# Patient Record
Sex: MALE | Race: WHITE | NOT HISPANIC OR LATINO | Employment: FULL TIME | URBAN - METROPOLITAN AREA
[De-identification: names, ages, dates, MRNs, and addresses within clinical notes are randomized per-mention and may not be internally consistent; named-entity substitution may affect disease eponyms.]

---

## 2020-12-28 RX ORDER — ATORVASTATIN CALCIUM 40 MG/1
40 TABLET, FILM COATED ORAL DAILY
Qty: 30 TABLET | Refills: 1 | Status: SHIPPED | OUTPATIENT
Start: 2020-12-28 | End: 2021-01-29 | Stop reason: SDUPTHER

## 2020-12-28 RX ORDER — LISINOPRIL 20 MG/1
20 TABLET ORAL DAILY
Qty: 30 TABLET | Refills: 1 | Status: SHIPPED | OUTPATIENT
Start: 2020-12-28 | End: 2021-02-02 | Stop reason: SDUPTHER

## 2020-12-28 RX ORDER — CITALOPRAM 40 MG/1
40 TABLET ORAL DAILY
Qty: 30 TABLET | Refills: 1 | Status: SHIPPED | OUTPATIENT
Start: 2020-12-28 | End: 2021-02-02 | Stop reason: SDUPTHER

## 2020-12-28 NOTE — TELEPHONE ENCOUNTER
Pt had an appt today, insurance isn't active until the 7th of January; asked if we can send in a months worth of meds to get him to an appt that will be covered

## 2021-01-22 DIAGNOSIS — Z00.00 ROUTINE GENERAL MEDICAL EXAMINATION AT A HEALTH CARE FACILITY: Primary | ICD-10-CM

## 2021-01-29 RX ORDER — ATORVASTATIN CALCIUM 40 MG/1
40 TABLET, FILM COATED ORAL DAILY
Qty: 90 TABLET | Refills: 1 | Status: SHIPPED | OUTPATIENT
Start: 2021-01-29 | End: 2021-02-02 | Stop reason: SDUPTHER

## 2021-01-29 NOTE — TELEPHONE ENCOUNTER
Caller: Kamlesh Denis    Relationship: Self    Best call back number: [phone]     Medication needed:   Requested Prescriptions     Pending Prescriptions Disp Refills   • atorvastatin (Lipitor) 40 MG tablet 30 tablet 1     Sig: Take 1 tablet by mouth Daily.       When do you need the refill by: 1-29-21    What details did the patient provide when requesting the medication: PATIENT WILL BE OUT OF MEDICATION BEFORE APPT    Does the patient have less than a 3 day supply:  [x] Yes  [] No    What is the patient's preferred pharmacy:    [facility]  [address] [place] [address]  [phone]

## 2021-02-02 ENCOUNTER — OFFICE VISIT (OUTPATIENT)
Dept: INTERNAL MEDICINE | Facility: CLINIC | Age: 36
End: 2021-02-02

## 2021-02-02 VITALS
RESPIRATION RATE: 16 BRPM | DIASTOLIC BLOOD PRESSURE: 86 MMHG | WEIGHT: 208 LBS | SYSTOLIC BLOOD PRESSURE: 128 MMHG | BODY MASS INDEX: 32.65 KG/M2 | OXYGEN SATURATION: 98 % | TEMPERATURE: 96.8 F | HEIGHT: 67 IN | HEART RATE: 88 BPM

## 2021-02-02 DIAGNOSIS — E78.2 MIXED HYPERLIPIDEMIA: Chronic | ICD-10-CM

## 2021-02-02 DIAGNOSIS — F41.1 GENERALIZED ANXIETY DISORDER: ICD-10-CM

## 2021-02-02 DIAGNOSIS — I10 ESSENTIAL (PRIMARY) HYPERTENSION: ICD-10-CM

## 2021-02-02 DIAGNOSIS — Z00.00 ROUTINE GENERAL MEDICAL EXAMINATION AT A HEALTH CARE FACILITY: Primary | ICD-10-CM

## 2021-02-02 PROCEDURE — 99395 PREV VISIT EST AGE 18-39: CPT | Performed by: INTERNAL MEDICINE

## 2021-02-02 PROCEDURE — 90471 IMMUNIZATION ADMIN: CPT | Performed by: INTERNAL MEDICINE

## 2021-02-02 PROCEDURE — 90714 TD VACC NO PRESV 7 YRS+ IM: CPT | Performed by: INTERNAL MEDICINE

## 2021-02-02 RX ORDER — LISINOPRIL 20 MG/1
20 TABLET ORAL DAILY
Qty: 90 TABLET | Refills: 1 | Status: SHIPPED | OUTPATIENT
Start: 2021-02-02 | End: 2021-08-03 | Stop reason: SDUPTHER

## 2021-02-02 RX ORDER — ATORVASTATIN CALCIUM 40 MG/1
40 TABLET, FILM COATED ORAL DAILY
Qty: 90 TABLET | Refills: 1 | Status: SHIPPED | OUTPATIENT
Start: 2021-02-02 | End: 2021-08-03 | Stop reason: SDUPTHER

## 2021-02-02 RX ORDER — CITALOPRAM 40 MG/1
40 TABLET ORAL DAILY
Qty: 90 TABLET | Refills: 1 | Status: SHIPPED | OUTPATIENT
Start: 2021-02-02 | End: 2021-08-03 | Stop reason: SDUPTHER

## 2021-02-02 NOTE — ASSESSMENT & PLAN NOTE
CONTROLLED  - cont on max dose celexa at this time, mostly controlled with only very few breakthrough symptoms, tolerable--> refills sent  - Reviewed potential side effects of medication including sexual performance changes, insomnia, fatigue, weight changes. Pt tolerating well without any issues.

## 2021-02-02 NOTE — ASSESSMENT & PLAN NOTE
IMPROVING  - started on high intensity statin 1 year ago with LDL >240 at that time, most recent labs with LDL at 111, still slightly above goal of 100 but close and with dramatic improvement, so no other changes to meds today  - refills sent

## 2021-02-02 NOTE — PROGRESS NOTES
Chief Complaint   Patient presents with   • Med Refill   • Anxiety   • Hypertension   • Hyperlipidemia   Annual Physical    Subjective   History of Present Illness    Kamlesh Denis is a 35 y.o. male here for annual wellness. Pt does not have any acute issues to discuss today. States overall things are going well. Taking all meds as prescribed without any issues.   Started on chol meds 1 year ago and has not had labs since that time.   Does still have occasional breakthrough on his anxiety meds, but overall feels like this has helped a lot and is relatively well controlled.     The following portions of the patient's history were reviewed and updated as appropriate: allergies, current medications, past family history, past medical history, past social history, past surgical history, and problem list.    Patient Care Team:  Dionne Dye MD as PCP - General (Internal Medicine & Pediatrics)    Review of Systems   Constitutional: Negative for activity change, chills and fever.   HENT: Negative for congestion, ear pain, rhinorrhea and sore throat.    Eyes: Negative for double vision, pain and visual disturbance.   Respiratory: Negative for cough, shortness of breath and wheezing.    Cardiovascular: Negative for chest pain, palpitations and leg swelling.   Gastrointestinal: Negative for abdominal pain, blood in stool, constipation, diarrhea, nausea and vomiting.   Endocrine: Negative for cold intolerance and heat intolerance.   Genitourinary: Negative for difficulty urinating, dysuria and frequency.   Musculoskeletal: Negative for arthralgias and myalgias.   Skin: Negative for rash and skin lesions.   Neurological: Negative for dizziness, tremors and headache.   Hematological: Negative for adenopathy. Does not bruise/bleed easily.   Psychiatric/Behavioral: Negative for behavioral problems and suicidal ideas.       Body mass index is 32.58 kg/m².   Vitals:    02/02/21 1458   BP: 128/86   Pulse: 88   Resp: 16   Temp:  "96.8 °F (36 °C)   SpO2: 98%   Weight: 94.3 kg (208 lb)   Height: 170.2 cm (67\")        Objective   Physical Exam  Vitals signs and nursing note reviewed.   Constitutional:       General: He is not in acute distress.     Appearance: Normal appearance.   HENT:      Head: Normocephalic and atraumatic.      Right Ear: Tympanic membrane and ear canal normal.      Left Ear: Tympanic membrane and ear canal normal.      Nose: Nose normal.      Mouth/Throat:      Mouth: Mucous membranes are moist.      Pharynx: Oropharynx is clear.   Eyes:      Extraocular Movements: Extraocular movements intact.      Conjunctiva/sclera: Conjunctivae normal.      Pupils: Pupils are equal, round, and reactive to light.   Neck:      Musculoskeletal: Normal range of motion and neck supple.   Cardiovascular:      Rate and Rhythm: Normal rate and regular rhythm.      Heart sounds: Normal heart sounds. No murmur.   Pulmonary:      Effort: Pulmonary effort is normal. No respiratory distress.      Breath sounds: Normal breath sounds. No wheezing or rhonchi.   Abdominal:      General: Bowel sounds are normal. There is no distension.      Palpations: Abdomen is soft. There is no mass.      Tenderness: There is no abdominal tenderness.      Comments: no hepatosplenomegaly   Musculoskeletal: Normal range of motion.         General: No deformity.   Skin:     General: Skin is warm and dry.      Capillary Refill: Capillary refill takes less than 2 seconds.      Findings: No lesion or rash.   Neurological:      General: No focal deficit present.      Mental Status: He is alert and oriented to person, place, and time.      Cranial Nerves: No cranial nerve deficit.   Psychiatric:         Mood and Affect: Mood normal.         Behavior: Behavior normal.         Judgment: Judgment normal.           Current Outpatient Medications:   •  atorvastatin (Lipitor) 40 MG tablet, Take 1 tablet by mouth Daily., Disp: 90 tablet, Rfl: 1  •  citalopram (CeleXA) 40 MG tablet, " Take 1 tablet by mouth Daily., Disp: 90 tablet, Rfl: 1  •  lisinopril (PRINIVIL,ZESTRIL) 20 MG tablet, Take 1 tablet by mouth Daily., Disp: 90 tablet, Rfl: 1     Lab Results   Component Value Date    HGBA1C 5.80 (H) 01/25/2021    TSH 0.614 01/25/2021        Health Maintenance   Topic Date Due   • TDAP/TD VACCINES (1 - Tdap) 10/02/2002   • INFLUENZA VACCINE  02/02/2022 (Originally 8/1/2020)   • LIPID PANEL  01/25/2022        Immunization History   Administered Date(s) Administered   • TD Preservative Free 02/02/2021       Assessment/Plan     1. Annual Wellness  - Labs ordered and reviewed including CBC, CMP, Fasting lipid panel, HgbA1C and TSH. Will call with results once available and make follow up plan and med changes as appropriate.   - Cont current medications for chronic medical issues, refills sent as needed today.   - EKG not indicated  - Cscope not indicated  - Prostate CA screening discussed with patient. Risks and benefits of all screening modalities reviewed, pt opts to proceed with no prostate cancer screening at this time  - Lung CA screening not indicated  - AAA screening not indicated  - Immunizations: declines Flu  Orders Placed This Encounter   Procedures   • Td Vaccine Greater Than or Equal To 8yo Preservative Free IM      - Advised behavioral modifications including dietary changes and increased exercise with goal of healthy weight and lifestyle.    2. Mixed hyperlipidemia  Assessment & Plan:  IMPROVING  - started on high intensity statin 1 year ago with LDL >240 at that time, most recent labs with LDL at 111, still slightly above goal of 100 but close and with dramatic improvement, so no other changes to meds today  - refills sent    Orders:  -     atorvastatin (Lipitor) 40 MG tablet; Take 1 tablet by mouth Daily.  Dispense: 90 tablet; Refill: 1    3. Essential (primary) hypertension  Assessment & Plan:  CONTROLLED  - cont on ACEi at this time, BP in goal range  - Cr and K+ checked and within  normal limits on this med  - Cont checking BP at home intermittently, call if values trend outside of goal range  Orders:  -     lisinopril (PRINIVIL,ZESTRIL) 20 MG tablet; Take 1 tablet by mouth Daily.  Dispense: 90 tablet; Refill: 1    4. Generalized anxiety disorder  Assessment & Plan:  CONTROLLED  - cont on max dose celexa at this time, mostly controlled with only very few breakthrough symptoms, tolerable--> refills sent  - Reviewed potential side effects of medication including sexual performance changes, insomnia, fatigue, weight changes. Pt tolerating well without any issues.     Orders:  -     citalopram (CeleXA) 40 MG tablet; Take 1 tablet by mouth Daily.  Dispense: 90 tablet; Refill: 1    Return in about 6 months (around 8/2/2021) for follow up HTN, HLD.     Ashlie Dye MD  [facility] [place] [facility]  Phone: [phone]  Fax: [phone]

## 2021-02-02 NOTE — ASSESSMENT & PLAN NOTE
CONTROLLED  - cont on ACEi at this time, BP in goal range  - Cr and K+ checked and within normal limits on this med  - Cont checking BP at home intermittently, call if values trend outside of goal range

## 2021-08-03 ENCOUNTER — OFFICE VISIT (OUTPATIENT)
Dept: INTERNAL MEDICINE | Facility: CLINIC | Age: 36
End: 2021-08-03

## 2021-08-03 VITALS
TEMPERATURE: 96.6 F | HEIGHT: 67 IN | DIASTOLIC BLOOD PRESSURE: 74 MMHG | SYSTOLIC BLOOD PRESSURE: 126 MMHG | RESPIRATION RATE: 16 BRPM | HEART RATE: 76 BPM | BODY MASS INDEX: 32.33 KG/M2 | OXYGEN SATURATION: 97 % | WEIGHT: 206 LBS

## 2021-08-03 DIAGNOSIS — I10 ESSENTIAL (PRIMARY) HYPERTENSION: Primary | ICD-10-CM

## 2021-08-03 DIAGNOSIS — E78.2 MIXED HYPERLIPIDEMIA: Chronic | ICD-10-CM

## 2021-08-03 DIAGNOSIS — F41.1 GENERALIZED ANXIETY DISORDER: ICD-10-CM

## 2021-08-03 PROCEDURE — 99214 OFFICE O/P EST MOD 30 MIN: CPT | Performed by: INTERNAL MEDICINE

## 2021-08-03 RX ORDER — LISINOPRIL 20 MG/1
20 TABLET ORAL DAILY
Qty: 90 TABLET | Refills: 1 | Status: SHIPPED | OUTPATIENT
Start: 2021-08-03 | End: 2021-09-16

## 2021-08-03 RX ORDER — BUSPIRONE HYDROCHLORIDE 7.5 MG/1
7.5 TABLET ORAL 2 TIMES DAILY
Qty: 60 TABLET | Refills: 1 | Status: SHIPPED | OUTPATIENT
Start: 2021-08-03 | End: 2021-09-03 | Stop reason: SDUPTHER

## 2021-08-03 RX ORDER — CITALOPRAM 40 MG/1
40 TABLET ORAL DAILY
Qty: 90 TABLET | Refills: 1 | Status: SHIPPED | OUTPATIENT
Start: 2021-08-03 | End: 2021-09-16

## 2021-08-03 RX ORDER — ATORVASTATIN CALCIUM 40 MG/1
40 TABLET, FILM COATED ORAL DAILY
Qty: 90 TABLET | Refills: 1 | Status: SHIPPED | OUTPATIENT
Start: 2021-08-03 | End: 2022-02-03

## 2021-08-03 NOTE — ASSESSMENT & PLAN NOTE
UNCONTROLLED  - cont on max dose celexa at this time, mostly controlled but with more notable breakthrough symptoms lately, will add low dose buspar to improve overall anxiety--> refills and Rx sent  - if new med leads to stable improvement in symptoms, pt should call in 1 month with clinical update and will send in Rx refills ongoing, if not effective, should schedule follow up to discuss other med options  - Reviewed potential side effects of medication including sexual performance changes, insomnia, fatigue, weight changes. Pt tolerating well without any issues.

## 2021-08-03 NOTE — ASSESSMENT & PLAN NOTE
CONTROLLED  - cont on ACEi at this time, BP in goal range--> refills sent  - Cont checking BP at home intermittently, call if values trend outside of goal range

## 2021-08-03 NOTE — PROGRESS NOTES
"Chief Complaint  Follow-up, Med Refill, Hyperlipidemia, and Hypertension    Subjective          Kamlesh Denis presents to [facility] for follow up and med refills. Pt taking all medications daily as prescribed with good reported compliance. No issues or side effects with meds.   Does note that he has had more anxious moments than he used to get, not as severe as it was prior to starting medications several years ago, but notable change in past 8 months. Does not occur daily but multiple times monthly. Doesn't seem to have any specific trigger, no major life changes or stressors that he can identify.       Objective   Vital Signs:     /74   Pulse 76   Temp 96.6 °F (35.9 °C)   Resp 16   Ht 170.2 cm (67\")   Wt 93.4 kg (206 lb)   SpO2 97%   BMI 32.26 kg/m²     Physical Exam  Vitals and nursing note reviewed.   Constitutional:       General: He is not in acute distress.     Appearance: Normal appearance.   Cardiovascular:      Rate and Rhythm: Normal rate and regular rhythm.      Pulses: Normal pulses.      Heart sounds: Normal heart sounds. No murmur heard.     Pulmonary:      Effort: Pulmonary effort is normal. No respiratory distress.      Breath sounds: Normal breath sounds.   Abdominal:      General: Abdomen is flat. Bowel sounds are normal.      Palpations: Abdomen is soft.      Tenderness: There is no abdominal tenderness.   Musculoskeletal:      Right lower leg: No edema.      Left lower leg: No edema.   Neurological:      Mental Status: He is alert and oriented to person, place, and time. Mental status is at baseline.   Psychiatric:         Mood and Affect: Mood normal.         Behavior: Behavior normal.          Result Review :   The following data was reviewed by: Dionne Dye MD on 08/03/2021:  CMP    CMP 1/25/21   Glucose 93   BUN 15   Creatinine 0.87   eGFR Non  Am 99   eGFR African Am 120   Sodium 140   Potassium 4.4   Chloride 103 "   Calcium 9.4   Total Protein 7.3   Albumin 4.50   Globulin 2.8   Total Bilirubin 0.2   Alkaline Phosphatase 91   AST (SGOT) 40   ALT (SGPT) 51 (A)   (A) Abnormal value       Comments are available for some flowsheets but are not being displayed.           CBC w/diff    CBC w/Diff 1/25/21   WBC 4.45   RBC 4.66   Hemoglobin 14.6   Hematocrit 43.1   MCV 92.5   MCH 31.3   MCHC 33.9   RDW 13.1   Platelets 192   Neutrophil Rel % 61.0   Lymphocyte Rel % 24.0   Monocyte Rel % 8.5   Eosinophil Rel % 5.4   Basophil Rel % 0.7           Lipid Panel    Lipid Panel 1/25/21   Total Cholesterol 194   Triglycerides 167 (A)   HDL Cholesterol 54   VLDL Cholesterol 29   LDL Cholesterol  111 (A)   (A) Abnormal value       Comments are available for some flowsheets but are not being displayed.           TSH    TSH 1/25/21   TSH 0.614           Most Recent A1C    HGBA1C Most Recent 1/25/21   Hemoglobin A1C 5.80 (A)   (A) Abnormal value       Comments are available for some flowsheets but are not being displayed.           Assessment and Plan      Diagnoses and all orders for this visit:    1. Essential (primary) hypertension (Primary)  Assessment & Plan:  CONTROLLED  - cont on ACEi at this time, BP in goal range--> refills sent  - Cont checking BP at home intermittently, call if values trend outside of goal range        Orders:  -     Comprehensive Metabolic Panel  -     Hemoglobin A1c  -     lisinopril (PRINIVIL,ZESTRIL) 20 MG tablet; Take 1 tablet by mouth Daily.  Dispense: 90 tablet; Refill: 1    2. Mixed hyperlipidemia  Assessment & Plan:  CONTROLLED  - FLP today for ongoing monitoring  - cont on current high intensity statin, will adjust dose as indicated based on labs  - cont with good diet and lifestyle changes including increased exercise, low chol and low fat diet, and increased fiber      Orders:  -     Lipid Panel  -     atorvastatin (Lipitor) 40 MG tablet; Take 1 tablet by mouth Daily.  Dispense: 90 tablet; Refill: 1    3.  Generalized anxiety disorder  Assessment & Plan:  UNCONTROLLED  - cont on max dose celexa at this time, mostly controlled but with more notable breakthrough symptoms lately, will add low dose buspar to improve overall anxiety--> refills and Rx sent  - Reviewed potential side effects of medication including sexual performance changes, insomnia, fatigue, weight changes. Pt tolerating well without any issues.       Orders:  -     citalopram (CeleXA) 40 MG tablet; Take 1 tablet by mouth Daily.  Dispense: 90 tablet; Refill: 1  -     busPIRone (BUSPAR) 7.5 MG tablet; Take 1 tablet by mouth 2 (two) times a day.  Dispense: 60 tablet; Refill: 1      Follow Up   Return in about 6 months (around 2/3/2022) for Annual physical.    Patient was given instructions and counseling regarding his condition or for health maintenance advice. Please see specific information pulled into the AVS if appropriate.     Ashlie Dye MD  [facility] [place] [facility]  Phone: [phone]  Fax: [phone]

## 2021-08-03 NOTE — ASSESSMENT & PLAN NOTE
CONTROLLED  - FLP today for ongoing monitoring  - cont on current high intensity statin, will adjust dose as indicated based on labs  - cont with good diet and lifestyle changes including increased exercise, low chol and low fat diet, and increased fiber

## 2021-08-04 LAB
ALBUMIN SERPL-MCNC: 5 G/DL (ref 3.5–5.2)
ALBUMIN/GLOB SERPL: 1.7 G/DL
ALP SERPL-CCNC: 97 U/L (ref 39–117)
ALT SERPL-CCNC: 46 U/L (ref 1–41)
AST SERPL-CCNC: 35 U/L (ref 1–40)
BILIRUB SERPL-MCNC: 0.3 MG/DL (ref 0–1.2)
BUN SERPL-MCNC: 13 MG/DL (ref 6–20)
BUN/CREAT SERPL: 13.3 (ref 7–25)
CALCIUM SERPL-MCNC: 10.3 MG/DL (ref 8.6–10.5)
CHLORIDE SERPL-SCNC: 101 MMOL/L (ref 98–107)
CHOLEST SERPL-MCNC: 233 MG/DL (ref 0–200)
CO2 SERPL-SCNC: 28.3 MMOL/L (ref 22–29)
CREAT SERPL-MCNC: 0.98 MG/DL (ref 0.76–1.27)
GLOBULIN SER CALC-MCNC: 3 GM/DL
GLUCOSE SERPL-MCNC: 82 MG/DL (ref 65–99)
HBA1C MFR BLD: 5.7 % (ref 4.8–5.6)
HDLC SERPL-MCNC: 49 MG/DL (ref 40–60)
LDLC SERPL CALC-MCNC: 126 MG/DL (ref 0–100)
POTASSIUM SERPL-SCNC: 4.4 MMOL/L (ref 3.5–5.2)
PROT SERPL-MCNC: 8 G/DL (ref 6–8.5)
SODIUM SERPL-SCNC: 141 MMOL/L (ref 136–145)
TRIGL SERPL-MCNC: 329 MG/DL (ref 0–150)
VLDLC SERPL CALC-MCNC: 58 MG/DL (ref 5–40)

## 2021-09-03 ENCOUNTER — TELEPHONE (OUTPATIENT)
Dept: INTERNAL MEDICINE | Facility: CLINIC | Age: 36
End: 2021-09-03

## 2021-09-03 DIAGNOSIS — F41.1 GENERALIZED ANXIETY DISORDER: ICD-10-CM

## 2021-09-03 RX ORDER — BUSPIRONE HYDROCHLORIDE 7.5 MG/1
7.5 TABLET ORAL 2 TIMES DAILY
Qty: 180 TABLET | Refills: 1 | Status: SHIPPED | OUTPATIENT
Start: 2021-09-03 | End: 2022-02-10 | Stop reason: SDUPTHER

## 2021-09-03 NOTE — TELEPHONE ENCOUNTER
Caller: Kamlesh Denis    Relationship: Self    Best call back number: [phone]    What is the best time to reach you: ANY    Who are you requesting to speak with (clinical staff, provider,  specific staff member): DR SAVAGE    What was the call regarding: PATIENT CALLED AND STATED PHARMACY WILL NOT REFILL BUSPAR GENERIC. PLEASE CALL PATIENT TO DISCUSS    Do you require a callback: YES

## 2021-09-16 DIAGNOSIS — F41.1 GENERALIZED ANXIETY DISORDER: ICD-10-CM

## 2021-09-16 DIAGNOSIS — I10 ESSENTIAL (PRIMARY) HYPERTENSION: ICD-10-CM

## 2021-09-16 RX ORDER — LISINOPRIL 20 MG/1
TABLET ORAL
Qty: 90 TABLET | Refills: 1 | Status: SHIPPED | OUTPATIENT
Start: 2021-09-16 | End: 2022-02-10 | Stop reason: SDUPTHER

## 2021-09-16 RX ORDER — CITALOPRAM 40 MG/1
TABLET ORAL
Qty: 90 TABLET | Refills: 1 | Status: SHIPPED | OUTPATIENT
Start: 2021-09-16 | End: 2022-02-10 | Stop reason: SDUPTHER

## 2021-09-16 NOTE — TELEPHONE ENCOUNTER
Rx Refill Note  Requested Prescriptions     Pending Prescriptions Disp Refills   • citalopram (CeleXA) 40 MG tablet [Pharmacy Med Name: CITALOPRAM HBR 40 MG TABLET] 90 tablet 1     Sig: TAKE 1 TABLET BY MOUTH EVERY DAY   • lisinopril (PRINIVIL,ZESTRIL) 20 MG tablet [Pharmacy Med Name: LISINOPRIL 20 MG TABLET] 90 tablet 1     Sig: TAKE 1 TABLET BY MOUTH EVERY DAY      Last office visit with prescribing clinician: 8/3/2021      Next office visit with prescribing clinician: 2/10/2022            Betzaida Mojica MA  09/16/21, 08:24 EDT

## 2022-02-03 DIAGNOSIS — E78.2 MIXED HYPERLIPIDEMIA: Chronic | ICD-10-CM

## 2022-02-03 RX ORDER — ATORVASTATIN CALCIUM 40 MG/1
TABLET, FILM COATED ORAL
Qty: 90 TABLET | Refills: 1 | Status: SHIPPED | OUTPATIENT
Start: 2022-02-03 | End: 2022-02-10 | Stop reason: SDUPTHER

## 2022-02-03 NOTE — TELEPHONE ENCOUNTER
Rx Refill Note  Requested Prescriptions     Pending Prescriptions Disp Refills   • atorvastatin (LIPITOR) 40 MG tablet [Pharmacy Med Name: ATORVASTATIN 40 MG TABLET] 90 tablet 1     Sig: TAKE 1 TABLET BY MOUTH EVERY DAY      Last office visit with prescribing clinician: 8/3/2021      Next office visit with prescribing clinician: 2/10/2022            Betzaida Mojica MA  02/03/22, 07:53 EST

## 2022-02-10 ENCOUNTER — OFFICE VISIT (OUTPATIENT)
Dept: INTERNAL MEDICINE | Facility: CLINIC | Age: 37
End: 2022-02-10

## 2022-02-10 VITALS
OXYGEN SATURATION: 98 % | DIASTOLIC BLOOD PRESSURE: 90 MMHG | RESPIRATION RATE: 16 BRPM | HEIGHT: 67 IN | BODY MASS INDEX: 32.65 KG/M2 | TEMPERATURE: 97.1 F | HEART RATE: 88 BPM | WEIGHT: 208 LBS | SYSTOLIC BLOOD PRESSURE: 140 MMHG

## 2022-02-10 DIAGNOSIS — Z00.00 ROUTINE GENERAL MEDICAL EXAMINATION AT A HEALTH CARE FACILITY: Primary | ICD-10-CM

## 2022-02-10 DIAGNOSIS — E78.2 MIXED HYPERLIPIDEMIA: Chronic | ICD-10-CM

## 2022-02-10 DIAGNOSIS — Z11.59 ENCOUNTER FOR HEPATITIS C SCREENING TEST FOR LOW RISK PATIENT: ICD-10-CM

## 2022-02-10 DIAGNOSIS — F41.1 GENERALIZED ANXIETY DISORDER: ICD-10-CM

## 2022-02-10 DIAGNOSIS — I10 ESSENTIAL (PRIMARY) HYPERTENSION: ICD-10-CM

## 2022-02-10 LAB
BILIRUB BLD-MCNC: NEGATIVE MG/DL
CLARITY, POC: CLEAR
COLOR UR: YELLOW
EXPIRATION DATE: ABNORMAL
GLUCOSE UR STRIP-MCNC: NEGATIVE MG/DL
KETONES UR QL: NEGATIVE
LEUKOCYTE EST, POC: NEGATIVE
Lab: ABNORMAL
NITRITE UR-MCNC: NEGATIVE MG/ML
PH UR: 8.5 [PH] (ref 5–8)
PROT UR STRIP-MCNC: NEGATIVE MG/DL
RBC # UR STRIP: NEGATIVE /UL
SP GR UR: 1.02 (ref 1–1.03)
UROBILINOGEN UR QL: NORMAL

## 2022-02-10 PROCEDURE — 81003 URINALYSIS AUTO W/O SCOPE: CPT | Performed by: INTERNAL MEDICINE

## 2022-02-10 PROCEDURE — 99395 PREV VISIT EST AGE 18-39: CPT | Performed by: INTERNAL MEDICINE

## 2022-02-10 RX ORDER — ATORVASTATIN CALCIUM 40 MG/1
40 TABLET, FILM COATED ORAL DAILY
Qty: 90 TABLET | Refills: 1 | Status: SHIPPED | OUTPATIENT
Start: 2022-02-10 | End: 2022-02-11 | Stop reason: SDUPTHER

## 2022-02-10 RX ORDER — LISINOPRIL 20 MG/1
20 TABLET ORAL DAILY
Qty: 90 TABLET | Refills: 1 | Status: SHIPPED | OUTPATIENT
Start: 2022-02-10 | End: 2022-07-12 | Stop reason: SDUPTHER

## 2022-02-10 RX ORDER — BUSPIRONE HYDROCHLORIDE 10 MG/1
10 TABLET ORAL 2 TIMES DAILY
Qty: 180 TABLET | Refills: 1 | Status: SHIPPED | OUTPATIENT
Start: 2022-02-10 | End: 2022-05-02 | Stop reason: SDUPTHER

## 2022-02-10 RX ORDER — CITALOPRAM 40 MG/1
40 TABLET ORAL DAILY
Qty: 90 TABLET | Refills: 1 | Status: SHIPPED | OUTPATIENT
Start: 2022-02-10 | End: 2022-07-12 | Stop reason: SDUPTHER

## 2022-02-10 NOTE — PROGRESS NOTES
Chief Complaint   Patient presents with   • Annual Exam       Subjective   History of Present Illness    Kamlesh Denis is a 36 y.o. male here for annual wellness. Pt does have any acute issues to discuss today. States overall things are going well. Taking all meds as prescribed without any issues.   Taking celexa daily for anxiety, buspar added at last visit and did feel like it was working well but has tapered off in effect lately. No side effects, is feeling more drowsy lately.     The following portions of the patient's history were reviewed and updated as appropriate: allergies, current medications, past family history, past medical history, past social history, past surgical history, and problem list.    Patient Care Team:  Dionne Dye MD as PCP - General (Internal Medicine & Pediatrics)    Review of Systems   Constitutional: Negative for activity change, chills and fever.   HENT: Negative for congestion, ear pain, rhinorrhea and sore throat.    Eyes: Negative for double vision, pain and visual disturbance.   Respiratory: Negative for cough, shortness of breath and wheezing.    Cardiovascular: Negative for chest pain, palpitations and leg swelling.   Gastrointestinal: Negative for abdominal pain, blood in stool, constipation, diarrhea, nausea and vomiting.   Endocrine: Negative for cold intolerance and heat intolerance.   Genitourinary: Negative for difficulty urinating, dysuria and frequency.   Musculoskeletal: Negative for arthralgias and myalgias.   Skin: Negative for rash and skin lesions.   Neurological: Negative for dizziness, tremors and headache.   Hematological: Negative for adenopathy. Does not bruise/bleed easily.   Psychiatric/Behavioral: Negative for behavioral problems and suicidal ideas. The patient is nervous/anxious.        Body mass index is 32.58 kg/m².   Vitals:    02/10/22 1027   BP: 140/90   Pulse: 88   Resp: 16   Temp: 97.1 °F (36.2 °C)   SpO2: 98%   Weight: 94.3 kg (208 lb)  "  Height: 170.2 cm (67\")        Objective   Physical Exam  Vitals and nursing note reviewed.   Constitutional:       General: He is not in acute distress.     Appearance: Normal appearance.   HENT:      Head: Normocephalic and atraumatic.      Right Ear: Tympanic membrane and ear canal normal.      Left Ear: Tympanic membrane and ear canal normal.      Nose: Nose normal.      Mouth/Throat:      Mouth: Mucous membranes are moist.      Pharynx: Oropharynx is clear.   Eyes:      Extraocular Movements: Extraocular movements intact.      Conjunctiva/sclera: Conjunctivae normal.      Pupils: Pupils are equal, round, and reactive to light.   Cardiovascular:      Rate and Rhythm: Normal rate and regular rhythm.      Heart sounds: Normal heart sounds. No murmur heard.      Pulmonary:      Effort: Pulmonary effort is normal. No respiratory distress.      Breath sounds: Normal breath sounds. No wheezing or rhonchi.   Abdominal:      General: Bowel sounds are normal. There is no distension.      Palpations: Abdomen is soft. There is no mass.      Tenderness: There is no abdominal tenderness.      Comments: no hepatosplenomegaly   Musculoskeletal:         General: No deformity. Normal range of motion.      Cervical back: Normal range of motion and neck supple.   Skin:     General: Skin is warm and dry.      Capillary Refill: Capillary refill takes less than 2 seconds.      Findings: No lesion or rash.   Neurological:      General: No focal deficit present.      Mental Status: He is alert and oriented to person, place, and time.      Cranial Nerves: No cranial nerve deficit.   Psychiatric:         Mood and Affect: Mood normal.         Behavior: Behavior normal.         Judgment: Judgment normal.       Brief Urine Lab Results  (Last result in the past 365 days)      Color   Clarity   Blood   Leuk Est   Nitrite   Protein   CREAT   Urine HCG        02/10/22 1341 Yellow   Clear   Negative   Negative   Negative   Negative           "           Current Outpatient Medications:   •  atorvastatin (LIPITOR) 40 MG tablet, Take 1 tablet by mouth Daily., Disp: 90 tablet, Rfl: 1  •  busPIRone (BUSPAR) 10 MG tablet, Take 1 tablet by mouth 2 (Two) Times a Day., Disp: 180 tablet, Rfl: 1  •  citalopram (CeleXA) 40 MG tablet, Take 1 tablet by mouth Daily., Disp: 90 tablet, Rfl: 1  •  lisinopril (PRINIVIL,ZESTRIL) 20 MG tablet, Take 1 tablet by mouth Daily., Disp: 90 tablet, Rfl: 1  •  azithromycin (ZITHROMAX) 250 MG tablet, Take 2 tablets the first day, then 1 tablet daily for 4 days., Disp: 6 tablet, Rfl: 0     Lab Results   Component Value Date    HGBA1C 5.70 (H) 08/03/2021    TSH 0.614 01/25/2021        Health Maintenance   Topic Date Due   • INFLUENZA VACCINE  02/10/2023 (Originally 8/1/2021)   • LIPID PANEL  08/03/2022   • TDAP/TD VACCINES (2 - Tdap) 02/02/2031        Immunization History   Administered Date(s) Administered   • TD Preservative Free 02/02/2021       Assessment/Plan     Annual Wellness  - Labs ordered today including CBC, CMP, Fasting lipid panel, HgbA1C, TSH and Hep C. Will call with results once available and make follow up plan and med changes as appropriate.   - Cont current medications for chronic medical issues, refills sent as needed today.   - EKG not indicated  - Cscope not yet indicated, due at 44 yo  - Prostate CA screening discussed with patient. Risks and benefits of all screening modalities reviewed, pt opts to proceed with no prostate cancer screening at this time  - Lung CA screening not indicated  - AAA screening not indicated   - Immunizations: Flu shot declined. COVID vaccination declined. Td UTD in 2/2021.   - Depression screen reviewed with patient and positive. On celexa at max dose with buspar twice daily, will increase dose of buspar for improved control.   - Advised behavioral modifications including dietary changes and increased exercise with goal of healthy weight and lifestyle.       Return in about 6 months  (around 8/10/2022) for follow up HTN, HLD, anxiety.     Ashlie Dye MD  [facility] [place] [facility]  Phone: [phone]  Fax: [phone]

## 2022-02-11 ENCOUNTER — PATIENT MESSAGE (OUTPATIENT)
Dept: INTERNAL MEDICINE | Facility: CLINIC | Age: 37
End: 2022-02-11

## 2022-02-11 DIAGNOSIS — E78.2 MIXED HYPERLIPIDEMIA: Chronic | ICD-10-CM

## 2022-02-11 LAB
ALBUMIN SERPL-MCNC: 4.8 G/DL (ref 4–5)
ALBUMIN/GLOB SERPL: 1.5 {RATIO} (ref 1.2–2.2)
ALP SERPL-CCNC: 110 IU/L (ref 44–121)
ALT SERPL-CCNC: 44 IU/L (ref 0–44)
AST SERPL-CCNC: 35 IU/L (ref 0–40)
BASOPHILS # BLD AUTO: 0 X10E3/UL (ref 0–0.2)
BASOPHILS NFR BLD AUTO: 1 %
BILIRUB SERPL-MCNC: 0.6 MG/DL (ref 0–1.2)
BUN SERPL-MCNC: 14 MG/DL (ref 6–20)
BUN/CREAT SERPL: 14 (ref 9–20)
CALCIUM SERPL-MCNC: 9.8 MG/DL (ref 8.7–10.2)
CHLORIDE SERPL-SCNC: 98 MMOL/L (ref 96–106)
CHOLEST SERPL-MCNC: 202 MG/DL (ref 100–199)
CO2 SERPL-SCNC: 24 MMOL/L (ref 20–29)
CREAT SERPL-MCNC: 1.03 MG/DL (ref 0.76–1.27)
EOSINOPHIL # BLD AUTO: 0.2 X10E3/UL (ref 0–0.4)
EOSINOPHIL NFR BLD AUTO: 5 %
ERYTHROCYTE [DISTWIDTH] IN BLOOD BY AUTOMATED COUNT: 12.7 % (ref 11.6–15.4)
GLOBULIN SER CALC-MCNC: 3.1 G/DL (ref 1.5–4.5)
GLUCOSE SERPL-MCNC: 98 MG/DL (ref 65–99)
HBA1C MFR BLD: 5.8 % (ref 4.8–5.6)
HCT VFR BLD AUTO: 44.9 % (ref 37.5–51)
HCV AB S/CO SERPL IA: <0.1 S/CO RATIO (ref 0–0.9)
HDLC SERPL-MCNC: 54 MG/DL
HGB BLD-MCNC: 15.5 G/DL (ref 13–17.7)
IMM GRANULOCYTES # BLD AUTO: 0 X10E3/UL (ref 0–0.1)
IMM GRANULOCYTES NFR BLD AUTO: 0 %
LDLC SERPL CALC-MCNC: 125 MG/DL (ref 0–99)
LYMPHOCYTES # BLD AUTO: 1 X10E3/UL (ref 0.7–3.1)
LYMPHOCYTES NFR BLD AUTO: 27 %
MCH RBC QN AUTO: 31.5 PG (ref 26.6–33)
MCHC RBC AUTO-ENTMCNC: 34.5 G/DL (ref 31.5–35.7)
MCV RBC AUTO: 91 FL (ref 79–97)
MONOCYTES # BLD AUTO: 0.3 X10E3/UL (ref 0.1–0.9)
MONOCYTES NFR BLD AUTO: 8 %
NEUTROPHILS # BLD AUTO: 2.1 X10E3/UL (ref 1.4–7)
NEUTROPHILS NFR BLD AUTO: 59 %
PLATELET # BLD AUTO: 213 X10E3/UL (ref 150–450)
POTASSIUM SERPL-SCNC: 4.8 MMOL/L (ref 3.5–5.2)
PROT SERPL-MCNC: 7.9 G/DL (ref 6–8.5)
RBC # BLD AUTO: 4.92 X10E6/UL (ref 4.14–5.8)
SODIUM SERPL-SCNC: 137 MMOL/L (ref 134–144)
TRIGL SERPL-MCNC: 132 MG/DL (ref 0–149)
TSH SERPL DL<=0.005 MIU/L-ACNC: 0.84 UIU/ML (ref 0.45–4.5)
VLDLC SERPL CALC-MCNC: 23 MG/DL (ref 5–40)
WBC # BLD AUTO: 3.6 X10E3/UL (ref 3.4–10.8)

## 2022-02-11 RX ORDER — ATORVASTATIN CALCIUM 40 MG/1
80 TABLET, FILM COATED ORAL DAILY
Qty: 90 TABLET | Refills: 1 | Status: SHIPPED | OUTPATIENT
Start: 2022-02-11 | End: 2022-05-02 | Stop reason: SDUPTHER

## 2022-02-11 NOTE — TELEPHONE ENCOUNTER
From: ELVIRA KARIMI  To: Kamlesh Denis  Sent: 2/11/2022 10:47 AM EST  Subject: Lab Results    Kamlesh,     I tried to call this morning but got your voicemail.    Below is Dr. Dye's interpretation on your most recent lab results:    1. Blood counts and electrolytes normal   2. Kidney, liver, thyroid function all in normal range   3. Diabetes test in preDM range but stable   4. Chol panel still slightly above goal with bad chol in 120s, since we are already on meds, goal would be to be below the 100 breanna, would recommend increasing the dose of his atorvastatin if he is ok with this?     If you have any questions please feel free to let me know.    Thanks!  Betzaida

## 2022-03-03 RX ORDER — BUSPIRONE HYDROCHLORIDE 7.5 MG/1
TABLET ORAL
Qty: 180 TABLET | Refills: 1 | OUTPATIENT
Start: 2022-03-03

## 2022-03-03 NOTE — TELEPHONE ENCOUNTER
Rx Refill Note  Requested Prescriptions     Pending Prescriptions Disp Refills   • busPIRone (BUSPAR) 7.5 MG tablet [Pharmacy Med Name: BUSPIRONE HCL 7.5 MG TABLET] 180 tablet 1     Sig: TAKE 1 TABLET BY MOUTH TWICE A DAY      Last office visit with prescribing clinician: 2/10/2022      Next office visit with prescribing clinician: 8/15/2022            Betzaida Mojica MA  03/03/22, 08:19 EST

## 2022-05-02 ENCOUNTER — TELEPHONE (OUTPATIENT)
Dept: INTERNAL MEDICINE | Facility: CLINIC | Age: 37
End: 2022-05-02

## 2022-05-02 DIAGNOSIS — E78.2 MIXED HYPERLIPIDEMIA: Chronic | ICD-10-CM

## 2022-05-02 DIAGNOSIS — F41.1 GENERALIZED ANXIETY DISORDER: ICD-10-CM

## 2022-05-02 RX ORDER — ATORVASTATIN CALCIUM 40 MG/1
80 TABLET, FILM COATED ORAL DAILY
Qty: 90 TABLET | Refills: 1 | Status: SHIPPED | OUTPATIENT
Start: 2022-05-02 | End: 2022-07-12 | Stop reason: SDUPTHER

## 2022-05-02 RX ORDER — BUSPIRONE HYDROCHLORIDE 15 MG/1
15 TABLET ORAL 2 TIMES DAILY
Qty: 60 TABLET | Refills: 1 | Status: SHIPPED | OUTPATIENT
Start: 2022-05-02 | End: 2022-05-27 | Stop reason: SDUPTHER

## 2022-05-02 NOTE — TELEPHONE ENCOUNTER
Pt informed; states that he would like to increase dose; also asked for refill on cholesterol meds    Rx Refill Note  Requested Prescriptions     Pending Prescriptions Disp Refills   • atorvastatin (LIPITOR) 40 MG tablet 90 tablet 1     Sig: Take 2 tablets by mouth Daily.      Last office visit with prescribing clinician: 2/10/2022      Next office visit with prescribing clinician: 8/15/2022            Betzaida Mojica MA  05/02/22, 13:33 EDT

## 2022-05-02 NOTE — TELEPHONE ENCOUNTER
Caller: Kamlesh Denis    Relationship: Self  Best call back number: [phone]     What is the best time to reach you: ANY TIME     Who are you requesting to speak with (clinical staff, provider,  specific staff member): CLINICAL    Do you know the name of the person who called: PATIENT     What was the call regarding: PATIENT CALLING REGARDING THE INCREASED DOSAGE ON HIS ANXIETY MEDICATION PER DR. SAVAGE'S ADVICE.  PATIENT STATES THAT HE HAS HAD A FEW BOUTS OF INCREASED ANXIETY ESPECIALLY WHEN DRIVING.  PATIENT IS WONDERING IF HIS ALLERGY MEDICATION COULD BE CAUSING THE INCREASED ANXIETY.   PLEASE ADVISE PATIENT IF HE SHOULD  INCREASE THE DOSAGE OF THE ANXIETY MEDICATION FURTHER OR IF THE INCREASED ANXIETY RECENTLY COULD BE DUE TO THE ALLERGY MEDICATION.      Do you require a callback: YES

## 2022-05-02 NOTE — TELEPHONE ENCOUNTER
Refill on higher dose sent, needs to follow up in 1 month after adjusting this dose to make sure it is working

## 2022-05-02 NOTE — TELEPHONE ENCOUNTER
So I have actually had someone else have heightened anxiety relaetd to allergy medications so this could certainly be contributing. However, if his allergies are bad and he can't really afford to stop it and monitor, we can increase his meds some for the short term to try to account for that

## 2022-05-27 ENCOUNTER — TELEPHONE (OUTPATIENT)
Dept: INTERNAL MEDICINE | Facility: CLINIC | Age: 37
End: 2022-05-27

## 2022-05-27 DIAGNOSIS — F41.1 GENERALIZED ANXIETY DISORDER: ICD-10-CM

## 2022-05-27 RX ORDER — BUSPIRONE HYDROCHLORIDE 15 MG/1
15 TABLET ORAL 2 TIMES DAILY
Qty: 180 TABLET | Refills: 1 | Status: SHIPPED | OUTPATIENT
Start: 2022-05-27 | End: 2022-08-23 | Stop reason: SDUPTHER

## 2022-05-27 NOTE — TELEPHONE ENCOUNTER
[facility] SENT A REQUEST FOR A 90-DAY SUPPLY OF DAQUAN'S BUSPIRONE HCL 15 MG TABLETS.  COULD YOU PLEASE SEND IT IN FOR HIM?    THANK YOU

## 2022-07-12 ENCOUNTER — TELEMEDICINE (OUTPATIENT)
Dept: INTERNAL MEDICINE | Facility: CLINIC | Age: 37
End: 2022-07-12

## 2022-07-12 DIAGNOSIS — R73.03 PREDIABETES: Chronic | ICD-10-CM

## 2022-07-12 DIAGNOSIS — I10 ESSENTIAL (PRIMARY) HYPERTENSION: Primary | ICD-10-CM

## 2022-07-12 DIAGNOSIS — F41.1 GENERALIZED ANXIETY DISORDER: ICD-10-CM

## 2022-07-12 DIAGNOSIS — E78.2 MIXED HYPERLIPIDEMIA: Chronic | ICD-10-CM

## 2022-07-12 PROCEDURE — 99214 OFFICE O/P EST MOD 30 MIN: CPT | Performed by: INTERNAL MEDICINE

## 2022-07-12 RX ORDER — LISINOPRIL 20 MG/1
20 TABLET ORAL DAILY
Qty: 90 TABLET | Refills: 1 | Status: SHIPPED | OUTPATIENT
Start: 2022-07-12 | End: 2022-08-23 | Stop reason: SDUPTHER

## 2022-07-12 RX ORDER — ATORVASTATIN CALCIUM 80 MG/1
80 TABLET, FILM COATED ORAL DAILY
Qty: 90 TABLET | Refills: 1 | Status: SHIPPED | OUTPATIENT
Start: 2022-07-12 | End: 2022-08-23 | Stop reason: SDUPTHER

## 2022-07-12 RX ORDER — CITALOPRAM 40 MG/1
40 TABLET ORAL DAILY
Qty: 90 TABLET | Refills: 1 | Status: SHIPPED | OUTPATIENT
Start: 2022-07-12 | End: 2022-08-23 | Stop reason: SDUPTHER

## 2022-07-12 NOTE — ASSESSMENT & PLAN NOTE
CONTROLLED  - FLP today for ongoing monitoring  - cont on current high intensity statin, had planned for patient to increase atorva dose to 80 mg after last lab check but has only been taking 40 mg daily  - cont with good diet and lifestyle changes including increased exercise, low chol and low fat diet, and increased fiber

## 2022-07-12 NOTE — ASSESSMENT & PLAN NOTE
UNCONTROLLED  - cont on celexa at 40 mg daily--> refills sent  - increase buspar dose to 20 mg BID, no refills needed  - can increase to TID dosing if above dose increase dose not accomplish desired improvements  - Reviewed potential side effects of medication including sexual performance changes, insomnia, fatigue, weight changes. Pt tolerating well without any issues.

## 2022-07-12 NOTE — PROGRESS NOTES
Chief Complaint  HTN, HLD, anxiety    You have chosen to receive care through a telehealth visit.  Do you consent to use a video/audio connection for your medical care today? Yes    Subjective          Kamlesh Denis presents to [facility] for follow up HTN, HLD, anxiety. Pt taking all medications daily as prescribed with good reported compliance. No issues or side effects with meds. Has ad dose of buspar increased a couple times over last couple of months as well as making some changes from a dietary standpoint limiting caffeine. Does state that he is still having breakthrough spells, does have a little more control of them when it happens, but still happening a couple times per week.   Does not check BP at home routinely, no chest pain, previous orthostasis has actually improved.       Objective      Physical Exam  Constitutional:       General: He is not in acute distress.     Appearance: Normal appearance.   Pulmonary:      Effort: Pulmonary effort is normal. No respiratory distress.   Neurological:      Mental Status: He is alert and oriented to person, place, and time. Mental status is at baseline.   Psychiatric:         Mood and Affect: Mood normal.         Thought Content: Thought content normal.         Judgment: Judgment normal.          Result Review :   The following data was reviewed by: Dionne Dye MD on 07/12/2022:  CMP    CMP 8/3/21 2/10/22   Glucose 82 98   BUN 13 14   Creatinine 0.98 1.03   eGFR Non  Am 86 92   eGFR African Am 104 106   Sodium 141 137   Potassium 4.4 4.8   Chloride 101 98   Calcium 10.3 9.8   Total Protein 8.0 7.9   Albumin 5.00 4.8   Globulin 3.0 3.1   Total Bilirubin 0.3 0.6   Alkaline Phosphatase 97 110   AST (SGOT) 35 35   ALT (SGPT) 46 (A) 44   (A) Abnormal value       Comments are available for some flowsheets but are not being displayed.           CBC w/diff    CBC w/Diff 2/10/22   WBC 3.6   RBC 4.92   Hemoglobin  15.5   Hematocrit 44.9   MCV 91   MCH 31.5   MCHC 34.5   RDW 12.7   Platelets 213   Neutrophil Rel % 59   Lymphocyte Rel % 27   Monocyte Rel % 8   Eosinophil Rel % 5   Basophil Rel % 1           Lipid Panel    Lipid Panel 8/3/21 2/10/22   Total Cholesterol 233 (A) 202 (A)   Triglycerides 329 (A) 132   HDL Cholesterol 49 54   VLDL Cholesterol 58 (A) 23   LDL Cholesterol  126 (A) 125 (A)   (A) Abnormal value       Comments are available for some flowsheets but are not being displayed.           TSH    TSH 2/10/22   TSH 0.841           Most Recent A1C    HGBA1C Most Recent 2/10/22   Hemoglobin A1C 5.8 (A)   (A) Abnormal value       Comments are available for some flowsheets but are not being displayed.                Assessment and Plan {CC Problem List  Visit Diagnosis  ROS  Review (Popup)  QuantuModeling Maintenance  Quality  BestPractice  Medications  SmartSets  SnapShot Encounters  Media :23}     Diagnoses and all orders for this visit:    1. Essential (primary) hypertension (Primary)  Assessment & Plan:  CONTROLLED  - cont on ACEi at this time, BP presumed in goal range, will check at lab appt  - will check Cr and K+  - start checking BP at home intermittently, call if values trend outside of goal range      Orders:  -     Comprehensive metabolic panel; Future  -     lisinopril (PRINIVIL,ZESTRIL) 20 MG tablet; Take 1 tablet by mouth Daily.  Dispense: 90 tablet; Refill: 1    2. Mixed hyperlipidemia  Assessment & Plan:  CONTROLLED  - FLP today for ongoing monitoring  - cont on current high intensity statin, had planned for patient to increase atorva dose to 80 mg after last lab check but has only been taking 40 mg daily  - cont with good diet and lifestyle changes including increased exercise, low chol and low fat diet, and increased fiber      Orders:  -     Comprehensive metabolic panel; Future  -     Lipid panel; Future  -     atorvastatin (LIPITOR) 80 MG tablet; Take 1 tablet by mouth Daily.  Dispense: 90  tablet; Refill: 1    3. Generalized anxiety disorder  Assessment & Plan:  UNCONTROLLED  - cont on celexa at 40 mg daily--> refills sent  - increase buspar dose to 20 mg BID, no refills needed  - can increase to TID dosing if above dose increase dose not accomplish desired improvements  - Reviewed potential side effects of medication including sexual performance changes, insomnia, fatigue, weight changes. Pt tolerating well without any issues.       Orders:  -     citalopram (CeleXA) 40 MG tablet; Take 1 tablet by mouth Daily.  Dispense: 90 tablet; Refill: 1    4. Prediabetes  -     Comprehensive metabolic panel; Future  -     Hemoglobin A1c; Future      Follow Up   Return for Next scheduled follow up.    Patient was given instructions and counseling regarding his condition or for health maintenance advice. Please see specific information pulled into the AVS if appropriate.     Ashlie Dye MD  [facility] [place] [facility]  Phone: [phone]  Fax: [phone]

## 2022-07-12 NOTE — ASSESSMENT & PLAN NOTE
CONTROLLED  - cont on ACEi at this time, BP presumed in goal range, will check at lab appt  - will check Cr and K+  - start checking BP at home intermittently, call if values trend outside of goal range

## 2022-08-23 ENCOUNTER — OFFICE VISIT (OUTPATIENT)
Dept: INTERNAL MEDICINE | Facility: CLINIC | Age: 37
End: 2022-08-23

## 2022-08-23 VITALS
OXYGEN SATURATION: 98 % | SYSTOLIC BLOOD PRESSURE: 118 MMHG | DIASTOLIC BLOOD PRESSURE: 62 MMHG | HEART RATE: 86 BPM | HEIGHT: 72 IN | BODY MASS INDEX: 28.58 KG/M2 | WEIGHT: 211 LBS | TEMPERATURE: 98.7 F | RESPIRATION RATE: 16 BRPM

## 2022-08-23 DIAGNOSIS — R73.03 PREDIABETES: Chronic | ICD-10-CM

## 2022-08-23 DIAGNOSIS — F41.1 GENERALIZED ANXIETY DISORDER: ICD-10-CM

## 2022-08-23 DIAGNOSIS — E78.2 MIXED HYPERLIPIDEMIA: Chronic | ICD-10-CM

## 2022-08-23 DIAGNOSIS — I10 ESSENTIAL (PRIMARY) HYPERTENSION: Primary | ICD-10-CM

## 2022-08-23 PROCEDURE — 99214 OFFICE O/P EST MOD 30 MIN: CPT | Performed by: INTERNAL MEDICINE

## 2022-08-23 RX ORDER — CITALOPRAM 40 MG/1
40 TABLET ORAL DAILY
Qty: 90 TABLET | Refills: 1 | Status: SHIPPED | OUTPATIENT
Start: 2022-08-23 | End: 2023-02-14 | Stop reason: SDUPTHER

## 2022-08-23 RX ORDER — LISINOPRIL 20 MG/1
20 TABLET ORAL DAILY
Qty: 90 TABLET | Refills: 1 | Status: SHIPPED | OUTPATIENT
Start: 2022-08-23 | End: 2023-02-14 | Stop reason: SDUPTHER

## 2022-08-23 RX ORDER — ATORVASTATIN CALCIUM 80 MG/1
80 TABLET, FILM COATED ORAL DAILY
Qty: 90 TABLET | Refills: 1 | Status: SHIPPED | OUTPATIENT
Start: 2022-08-23 | End: 2023-02-14 | Stop reason: SDUPTHER

## 2022-08-23 RX ORDER — BUSPIRONE HYDROCHLORIDE 10 MG/1
20 TABLET ORAL 2 TIMES DAILY
Qty: 360 TABLET | Refills: 1 | Status: SHIPPED | OUTPATIENT
Start: 2022-08-23 | End: 2022-08-29 | Stop reason: SDUPTHER

## 2022-08-23 NOTE — ASSESSMENT & PLAN NOTE
IMPROVED  - cont on celexa at 40 mg daily--> refills sent  - cont on buspar 20 mg BID--> refills sent  - can increase to TID dosing if above dose increase dose not accomplish desired improvements  - Reviewed potential side effects of medication including sexual performance changes, insomnia, fatigue, weight changes. Pt tolerating well without any issues.

## 2022-08-23 NOTE — ASSESSMENT & PLAN NOTE
CONTROLLED  - FLP today for ongoing monitoring  - cont on current high intensity statin, will adjust as needed based on labs  - cont with good diet and lifestyle changes including increased exercise, low chol and low fat diet, and increased fiber

## 2022-08-23 NOTE — PROGRESS NOTES
"Chief Complaint  Follow-up, Hyperlipidemia, Hypertension, and Anxiety    Subjective          Kamlesh Denis presents to [facility] for follow up and med refills. Pt taking all medications daily as prescribed with good reported compliance. No issues or side effects with meds.       Objective   Vital Signs:     /62   Pulse 86   Temp 98.7 °F (37.1 °C)   Resp 16   Ht 182.9 cm (72\")   Wt 95.7 kg (211 lb)   SpO2 98%   BMI 28.62 kg/m²     Physical Exam  Vitals and nursing note reviewed.   Constitutional:       General: He is not in acute distress.     Appearance: Normal appearance.   Cardiovascular:      Rate and Rhythm: Normal rate and regular rhythm.      Pulses: Normal pulses.      Heart sounds: Normal heart sounds. No murmur heard.  Pulmonary:      Effort: Pulmonary effort is normal. No respiratory distress.      Breath sounds: Normal breath sounds.   Abdominal:      General: Abdomen is flat. Bowel sounds are normal.      Palpations: Abdomen is soft.      Tenderness: There is no abdominal tenderness.   Musculoskeletal:      Right lower leg: No edema.      Left lower leg: No edema.   Neurological:      Mental Status: He is alert and oriented to person, place, and time. Mental status is at baseline.   Psychiatric:         Mood and Affect: Mood normal.         Behavior: Behavior normal.          Result Review :   The following data was reviewed by: Dionne Dye MD on 08/23/2022:  CMP    CMP 2/10/22   Glucose 98   BUN 14   Creatinine 1.03   eGFR Non  Am 92   eGFR  Am 106   Sodium 137   Potassium 4.8   Chloride 98   Calcium 9.8   Total Protein 7.9   Albumin 4.8   Globulin 3.1   Total Bilirubin 0.6   Alkaline Phosphatase 110   AST (SGOT) 35   ALT (SGPT) 44      Comments are available for some flowsheets but are not being displayed.           CBC w/diff    CBC w/Diff 2/10/22   WBC 3.6   RBC 4.92   Hemoglobin 15.5   Hematocrit 44.9   MCV 91   MCH " 31.5   MCHC 34.5   RDW 12.7   Platelets 213   Neutrophil Rel % 59   Lymphocyte Rel % 27   Monocyte Rel % 8   Eosinophil Rel % 5   Basophil Rel % 1           Lipid Panel    Lipid Panel 2/10/22   Total Cholesterol 202 (A)   Triglycerides 132   HDL Cholesterol 54   VLDL Cholesterol 23   LDL Cholesterol  125 (A)   (A) Abnormal value            Electrolytes    Electrolytes 2/10/22   Sodium 137   Potassium 4.8   Chloride 98   Calcium 9.8           Most Recent A1C    HGBA1C Most Recent 2/10/22   Hemoglobin A1C 5.8 (A)   (A) Abnormal value       Comments are available for some flowsheets but are not being displayed.              Assessment and Plan      Diagnoses and all orders for this visit:    1. Essential (primary) hypertension (Primary)  Assessment & Plan:  CONTROLLED  - cont on ACEi at this time, BP presumed in goal range, will check at lab appt  - will check Cr and K+  - start checking BP at home intermittently, call if values trend outside of goal range      Orders:  -     lisinopril (PRINIVIL,ZESTRIL) 20 MG tablet; Take 1 tablet by mouth Daily.  Dispense: 90 tablet; Refill: 1  -     Comprehensive Metabolic Panel    2. Mixed hyperlipidemia  Assessment & Plan:  CONTROLLED  - FLP today for ongoing monitoring  - cont on current high intensity statin, will adjust as needed based on labs  - cont with good diet and lifestyle changes including increased exercise, low chol and low fat diet, and increased fiber      Orders:  -     atorvastatin (LIPITOR) 80 MG tablet; Take 1 tablet by mouth Daily.  Dispense: 90 tablet; Refill: 1  -     Comprehensive Metabolic Panel  -     Lipid Panel    3. Prediabetes  -     Comprehensive Metabolic Panel  -     Hemoglobin A1c    4. Generalized anxiety disorder  Assessment & Plan:  IMPROVED  - cont on celexa at 40 mg daily--> refills sent  - cont on buspar 20 mg BID--> refills sent  - can increase to TID dosing if above dose increase dose not accomplish desired improvements  - Reviewed  potential side effects of medication including sexual performance changes, insomnia, fatigue, weight changes. Pt tolerating well without any issues.       Orders:  -     busPIRone (BUSPAR) 10 MG tablet; Take 2 tablets by mouth 2 (Two) Times a Day.  Dispense: 360 tablet; Refill: 1  -     citalopram (CeleXA) 40 MG tablet; Take 1 tablet by mouth Daily.  Dispense: 90 tablet; Refill: 1        Follow Up   Return in about 6 months (around 2/23/2023) for Annual physical.    Patient was given instructions and counseling regarding his condition or for health maintenance advice. Please see specific information pulled into the AVS if appropriate.     Ashlie Dye MD  [facility] [place] [facility]  Phone: [phone]  Fax: [phone]

## 2022-08-24 LAB
ALBUMIN SERPL-MCNC: 4.8 G/DL (ref 4–5)
ALBUMIN/GLOB SERPL: 1.5 {RATIO} (ref 1.2–2.2)
ALP SERPL-CCNC: 102 IU/L (ref 44–121)
ALT SERPL-CCNC: 55 IU/L (ref 0–44)
AST SERPL-CCNC: 42 IU/L (ref 0–40)
BILIRUB SERPL-MCNC: 0.5 MG/DL (ref 0–1.2)
BUN SERPL-MCNC: 12 MG/DL (ref 6–20)
BUN/CREAT SERPL: 13 (ref 9–20)
CALCIUM SERPL-MCNC: 9.7 MG/DL (ref 8.7–10.2)
CHLORIDE SERPL-SCNC: 100 MMOL/L (ref 96–106)
CHOLEST SERPL-MCNC: 227 MG/DL (ref 100–199)
CO2 SERPL-SCNC: 26 MMOL/L (ref 20–29)
CREAT SERPL-MCNC: 0.94 MG/DL (ref 0.76–1.27)
EGFRCR-CYS SERPLBLD CKD-EPI 2021: 106 ML/MIN/1.73
GLOBULIN SER CALC-MCNC: 3.1 G/DL (ref 1.5–4.5)
GLUCOSE SERPL-MCNC: 86 MG/DL (ref 65–99)
HBA1C MFR BLD: 5.9 % (ref 4.8–5.6)
HDLC SERPL-MCNC: 48 MG/DL
LDLC SERPL CALC-MCNC: 124 MG/DL (ref 0–99)
POTASSIUM SERPL-SCNC: 4.7 MMOL/L (ref 3.5–5.2)
PROT SERPL-MCNC: 7.9 G/DL (ref 6–8.5)
SODIUM SERPL-SCNC: 142 MMOL/L (ref 134–144)
TRIGL SERPL-MCNC: 313 MG/DL (ref 0–149)
VLDLC SERPL CALC-MCNC: 55 MG/DL (ref 5–40)

## 2022-08-25 RX ORDER — EZETIMIBE 10 MG/1
10 TABLET ORAL DAILY
Qty: 90 TABLET | Refills: 1 | Status: SHIPPED | OUTPATIENT
Start: 2022-08-25 | End: 2023-02-17

## 2022-08-26 RX ORDER — BUSPIRONE HYDROCHLORIDE 15 MG/1
TABLET ORAL
Qty: 180 TABLET | Refills: 1 | OUTPATIENT
Start: 2022-08-26

## 2022-08-29 DIAGNOSIS — F41.1 GENERALIZED ANXIETY DISORDER: ICD-10-CM

## 2022-08-29 RX ORDER — BUSPIRONE HYDROCHLORIDE 10 MG/1
20 TABLET ORAL 2 TIMES DAILY
Qty: 360 TABLET | Refills: 1 | Status: SHIPPED | OUTPATIENT
Start: 2022-08-29 | End: 2023-02-14

## 2022-08-29 NOTE — TELEPHONE ENCOUNTER
Rx Refill Note  Requested Prescriptions     Pending Prescriptions Disp Refills   • busPIRone (BUSPAR) 10 MG tablet 360 tablet 1     Sig: Take 2 tablets by mouth 2 (Two) Times a Day.      Last office visit with prescribing clinician: 8/23/2022      Next office visit with prescribing clinician: 2/14/2023            Betzaida Mojica MA  08/29/22, 11:31 EDT

## 2022-08-29 NOTE — TELEPHONE ENCOUNTER
Caller: Kamlesh Denis    Relationship: Self    Best call back number:[phone]    Requested Prescriptions:   Requested Prescriptions     Pending Prescriptions Disp Refills   • busPIRone (BUSPAR) 10 MG tablet 360 tablet 1     Sig: Take 2 tablets by mouth 2 (Two) Times a Day.        Pharmacy where request should be sent: [facility] [id number] [place] [address] - [phone]  - [phone]     Additional details provided by patient: PATIENT IS OUT    Does the patient have less than a 3 day supply:  [x] Yes  [] No    Denice LEGGETT Rep   08/29/22 10:51 EDT

## 2022-10-14 DIAGNOSIS — E78.2 MIXED HYPERLIPIDEMIA: Chronic | ICD-10-CM

## 2022-10-14 RX ORDER — ATORVASTATIN CALCIUM 40 MG/1
80 TABLET, FILM COATED ORAL DAILY
Qty: 180 TABLET | OUTPATIENT
Start: 2022-10-14

## 2022-11-18 RX ORDER — BUSPIRONE HYDROCHLORIDE 15 MG/1
TABLET ORAL
Qty: 180 TABLET | OUTPATIENT
Start: 2022-11-18

## 2023-02-14 ENCOUNTER — OFFICE VISIT (OUTPATIENT)
Dept: INTERNAL MEDICINE | Facility: CLINIC | Age: 38
End: 2023-02-14
Payer: OTHER GOVERNMENT

## 2023-02-14 VITALS
DIASTOLIC BLOOD PRESSURE: 82 MMHG | OXYGEN SATURATION: 97 % | SYSTOLIC BLOOD PRESSURE: 128 MMHG | BODY MASS INDEX: 29.12 KG/M2 | WEIGHT: 215 LBS | HEIGHT: 72 IN | RESPIRATION RATE: 16 BRPM | TEMPERATURE: 97.4 F | HEART RATE: 86 BPM

## 2023-02-14 DIAGNOSIS — R73.03 PREDIABETES: Chronic | ICD-10-CM

## 2023-02-14 DIAGNOSIS — Z00.00 ROUTINE GENERAL MEDICAL EXAMINATION AT A HEALTH CARE FACILITY: Primary | ICD-10-CM

## 2023-02-14 DIAGNOSIS — F41.1 GENERALIZED ANXIETY DISORDER: ICD-10-CM

## 2023-02-14 DIAGNOSIS — I10 ESSENTIAL (PRIMARY) HYPERTENSION: ICD-10-CM

## 2023-02-14 DIAGNOSIS — E78.2 MIXED HYPERLIPIDEMIA: Chronic | ICD-10-CM

## 2023-02-14 LAB
BILIRUB BLD-MCNC: NEGATIVE MG/DL
CLARITY, POC: CLEAR
COLOR UR: YELLOW
EXPIRATION DATE: NORMAL
GLUCOSE UR STRIP-MCNC: NEGATIVE MG/DL
KETONES UR QL: NEGATIVE
LEUKOCYTE EST, POC: NEGATIVE
Lab: NORMAL
NITRITE UR-MCNC: NEGATIVE MG/ML
PH UR: 7 [PH] (ref 5–8)
PROT UR STRIP-MCNC: NEGATIVE MG/DL
RBC # UR STRIP: NEGATIVE /UL
SP GR UR: 1 (ref 1–1.03)
UROBILINOGEN UR QL: NORMAL

## 2023-02-14 PROCEDURE — 99395 PREV VISIT EST AGE 18-39: CPT | Performed by: INTERNAL MEDICINE

## 2023-02-14 PROCEDURE — 90686 IIV4 VACC NO PRSV 0.5 ML IM: CPT | Performed by: INTERNAL MEDICINE

## 2023-02-14 PROCEDURE — 81003 URINALYSIS AUTO W/O SCOPE: CPT | Performed by: INTERNAL MEDICINE

## 2023-02-14 PROCEDURE — 90471 IMMUNIZATION ADMIN: CPT | Performed by: INTERNAL MEDICINE

## 2023-02-14 RX ORDER — BUSPIRONE HYDROCHLORIDE 15 MG/1
15 TABLET ORAL 2 TIMES DAILY
Qty: 180 TABLET | Refills: 1 | Status: SHIPPED | OUTPATIENT
Start: 2023-02-14 | End: 2023-02-14 | Stop reason: SDUPTHER

## 2023-02-14 RX ORDER — LISINOPRIL 20 MG/1
20 TABLET ORAL DAILY
Qty: 90 TABLET | Refills: 1 | Status: SHIPPED | OUTPATIENT
Start: 2023-02-14

## 2023-02-14 RX ORDER — BUSPIRONE HYDROCHLORIDE 15 MG/1
15 TABLET ORAL 2 TIMES DAILY
Qty: 180 TABLET | Refills: 1 | Status: SHIPPED | OUTPATIENT
Start: 2023-02-14

## 2023-02-14 RX ORDER — ATORVASTATIN CALCIUM 80 MG/1
80 TABLET, FILM COATED ORAL DAILY
Qty: 90 TABLET | Refills: 1 | Status: SHIPPED | OUTPATIENT
Start: 2023-02-14

## 2023-02-14 RX ORDER — CITALOPRAM 40 MG/1
40 TABLET ORAL DAILY
Qty: 90 TABLET | Refills: 1 | Status: SHIPPED | OUTPATIENT
Start: 2023-02-14

## 2023-02-14 NOTE — PROGRESS NOTES
"Chief Complaint   Patient presents with   • Annual Exam       Subjective   History of Present Illness    Kamlesh Denis is a 37 y.o. male here for annual wellness. Pt does not have any acute issues to discuss today. States overall things are going well. Taking all meds as prescribed without any issues.   Added zetia to his HLD regimen last check due to maxed on statin dose and LDL still out of goal range.     The following portions of the patient's history were reviewed and updated as appropriate: allergies, current medications, past family history, past medical history, past social history, past surgical history, and problem list.    Patient Care Team:  Dionne Dye MD as PCP - General (Internal Medicine & Pediatrics)    Review of Systems   Constitutional: Negative for activity change, chills and fever.   HENT: Negative for congestion, ear pain, rhinorrhea and sore throat.    Eyes: Negative for double vision, pain and visual disturbance.   Respiratory: Negative for cough, shortness of breath and wheezing.    Cardiovascular: Negative for chest pain, palpitations and leg swelling.   Gastrointestinal: Negative for abdominal pain, blood in stool, constipation, diarrhea, nausea and vomiting.   Endocrine: Negative for cold intolerance and heat intolerance.   Genitourinary: Negative for difficulty urinating, dysuria and frequency.   Musculoskeletal: Negative for arthralgias and myalgias.   Skin: Negative for rash and skin lesions.   Neurological: Negative for dizziness, tremors and headache.   Hematological: Negative for adenopathy. Does not bruise/bleed easily.   Psychiatric/Behavioral: Negative for behavioral problems and suicidal ideas.       Body mass index is 29.16 kg/m².   Vitals:    02/14/23 1026   BP: 128/82   Pulse: 86   Resp: 16   Temp: 97.4 °F (36.3 °C)   SpO2: 97%   Weight: 97.5 kg (215 lb)   Height: 182.9 cm (72\")        Objective   Physical Exam  Vitals and nursing note reviewed.   Constitutional:     "   General: He is not in acute distress.     Appearance: Normal appearance.   HENT:      Head: Normocephalic and atraumatic.      Right Ear: Tympanic membrane and ear canal normal.      Left Ear: Tympanic membrane and ear canal normal.      Nose: Nose normal.      Mouth/Throat:      Mouth: Mucous membranes are moist.      Pharynx: Oropharynx is clear.   Eyes:      Extraocular Movements: Extraocular movements intact.      Conjunctiva/sclera: Conjunctivae normal.      Pupils: Pupils are equal, round, and reactive to light.   Cardiovascular:      Rate and Rhythm: Normal rate and regular rhythm.      Heart sounds: Normal heart sounds. No murmur heard.  Pulmonary:      Effort: Pulmonary effort is normal. No respiratory distress.      Breath sounds: Normal breath sounds. No wheezing or rhonchi.   Abdominal:      General: Bowel sounds are normal. There is no distension.      Palpations: Abdomen is soft. There is no mass.      Tenderness: There is no abdominal tenderness.      Comments: no hepatosplenomegaly   Musculoskeletal:         General: No deformity. Normal range of motion.      Cervical back: Normal range of motion and neck supple.   Skin:     General: Skin is warm and dry.      Capillary Refill: Capillary refill takes less than 2 seconds.      Findings: No lesion or rash.   Neurological:      General: No focal deficit present.      Mental Status: He is alert and oriented to person, place, and time.      Cranial Nerves: No cranial nerve deficit.   Psychiatric:         Mood and Affect: Mood normal.         Behavior: Behavior normal.         Judgment: Judgment normal.       Brief Urine Lab Results  (Last result in the past 365 days)      Color   Clarity   Blood   Leuk Est   Nitrite   Protein   CREAT   Urine HCG        02/14/23 1114 Yellow   Clear   Negative   Negative   Negative   Negative                   Current Outpatient Medications:   •  atorvastatin (LIPITOR) 80 MG tablet, Take 1 tablet by mouth Daily., Disp: 90  tablet, Rfl: 1  •  busPIRone (BUSPAR) 15 MG tablet, Take 1 tablet by mouth 2 (Two) Times a Day., Disp: 180 tablet, Rfl: 1  •  citalopram (CeleXA) 40 MG tablet, Take 1 tablet by mouth Daily., Disp: 90 tablet, Rfl: 1  •  ezetimibe (Zetia) 10 MG tablet, Take 1 tablet by mouth Daily., Disp: 90 tablet, Rfl: 1  •  lisinopril (PRINIVIL,ZESTRIL) 20 MG tablet, Take 1 tablet by mouth Daily., Disp: 90 tablet, Rfl: 1  •  Pseudoeph-Doxylamine-DM-APAP (NYQUIL PO), Take  by mouth., Disp: , Rfl:      Lab Results   Component Value Date    HGBA1C 5.9 (H) 08/23/2022    TSH 0.841 02/10/2022        Health Maintenance   Topic Date Due   • LIPID PANEL  08/23/2023   • TDAP/TD VACCINES (2 - Tdap) 02/02/2031   • INFLUENZA VACCINE  Completed        Immunization History   Administered Date(s) Administered   • FluLaval/Fluzone >6mos 02/14/2023   • TD Preservative Free 02/02/2021       Assessment & Plan     Annual Wellness  - Labs ordered today including CBC, CMP, Fasting lipid panel, HgbA1C and TSH. Will call with results once available and make follow up plan and med changes as appropriate.   - Cont current medications for chronic medical issues, refills sent as needed today.   - EKG not indicated  - Cscope not yet indicated, due at 42 yo  - Prostate CA screening- not indicated, due at 52 yo  - Lung CA screening not indicated  - AAA screening not indicated   - Immunizations: Flu shot due, ordered today. COVID series declined. Td done 2021, UTD.   -   Orders Placed This Encounter   Procedures   • FluLaval/Fluzone >6 mos ([id number])      - Depression screen reviewed with patient and negative. Cont on current med regimen with SSRI + buspar BID--> refills sent, feels like he is doing better overall compared to his last appt  - Advised behavioral modifications including dietary changes and increased exercise with goal of healthy weight and lifestyle.       Return in about 6 months (around 8/14/2023) for follow up with labs.     Ashlie Dye,  MD [facility]  Phone: [phone]  Fax: [phone]

## 2023-02-14 NOTE — ASSESSMENT & PLAN NOTE
CONTROLLED  - cont on ACEi at this time, BP presumed in goal range, will check at lab appt  - start checking BP at home intermittently, call if values trend outside of goal range

## 2023-02-14 NOTE — ASSESSMENT & PLAN NOTE
IMPROVED  - cont on celexa at 40 mg daily--> refills sent  - cont on buspar 15 mg BID--> refills sent  - can increase to TID dosing if above dose increase dose not accomplish desired improvements  - Reviewed potential side effects of medication including sexual performance changes, insomnia, fatigue, weight changes. Pt tolerating well without any issues.

## 2023-02-14 NOTE — ASSESSMENT & PLAN NOTE
CONTROLLED  - FLP today for ongoing monitoring  - cont on current high intensity statin + zetia, will adjust as needed based on labs  - cont with good diet and lifestyle changes including increased exercise, low chol and low fat diet, and increased fiber

## 2023-02-15 LAB
ALBUMIN SERPL-MCNC: 4.8 G/DL (ref 3.5–5.2)
ALBUMIN/GLOB SERPL: 1.7 G/DL
ALP SERPL-CCNC: 99 U/L (ref 39–117)
ALT SERPL-CCNC: 55 U/L (ref 1–41)
AST SERPL-CCNC: 48 U/L (ref 1–40)
BASOPHILS # BLD AUTO: 0.04 10*3/MM3 (ref 0–0.2)
BASOPHILS NFR BLD AUTO: 1 % (ref 0–1.5)
BILIRUB SERPL-MCNC: 0.6 MG/DL (ref 0–1.2)
BUN SERPL-MCNC: 11 MG/DL (ref 6–20)
BUN/CREAT SERPL: 10.5 (ref 7–25)
CALCIUM SERPL-MCNC: 9.7 MG/DL (ref 8.6–10.5)
CHLORIDE SERPL-SCNC: 99 MMOL/L (ref 98–107)
CHOLEST SERPL-MCNC: 166 MG/DL (ref 0–200)
CO2 SERPL-SCNC: 29.2 MMOL/L (ref 22–29)
CREAT SERPL-MCNC: 1.05 MG/DL (ref 0.76–1.27)
EGFRCR SERPLBLD CKD-EPI 2021: 92.6 ML/MIN/1.73
EOSINOPHIL # BLD AUTO: 0.33 10*3/MM3 (ref 0–0.4)
EOSINOPHIL NFR BLD AUTO: 7.9 % (ref 0.3–6.2)
ERYTHROCYTE [DISTWIDTH] IN BLOOD BY AUTOMATED COUNT: 12.7 % (ref 12.3–15.4)
GLOBULIN SER CALC-MCNC: 2.9 GM/DL
GLUCOSE SERPL-MCNC: 102 MG/DL (ref 65–99)
HBA1C MFR BLD: 5.8 % (ref 4.8–5.6)
HCT VFR BLD AUTO: 45.9 % (ref 37.5–51)
HDLC SERPL-MCNC: 52 MG/DL (ref 40–60)
HGB BLD-MCNC: 15.6 G/DL (ref 13–17.7)
IMM GRANULOCYTES # BLD AUTO: 0.01 10*3/MM3 (ref 0–0.05)
IMM GRANULOCYTES NFR BLD AUTO: 0.2 % (ref 0–0.5)
LDLC SERPL CALC-MCNC: 79 MG/DL (ref 0–100)
LYMPHOCYTES # BLD AUTO: 1.08 10*3/MM3 (ref 0.7–3.1)
LYMPHOCYTES NFR BLD AUTO: 25.8 % (ref 19.6–45.3)
MCH RBC QN AUTO: 30.6 PG (ref 26.6–33)
MCHC RBC AUTO-ENTMCNC: 34 G/DL (ref 31.5–35.7)
MCV RBC AUTO: 90 FL (ref 79–97)
MONOCYTES # BLD AUTO: 0.37 10*3/MM3 (ref 0.1–0.9)
MONOCYTES NFR BLD AUTO: 8.9 % (ref 5–12)
NEUTROPHILS # BLD AUTO: 2.35 10*3/MM3 (ref 1.7–7)
NEUTROPHILS NFR BLD AUTO: 56.2 % (ref 42.7–76)
NRBC BLD AUTO-RTO: 0 /100 WBC (ref 0–0.2)
PLATELET # BLD AUTO: 239 10*3/MM3 (ref 140–450)
POTASSIUM SERPL-SCNC: 5 MMOL/L (ref 3.5–5.2)
PROT SERPL-MCNC: 7.7 G/DL (ref 6–8.5)
RBC # BLD AUTO: 5.1 10*6/MM3 (ref 4.14–5.8)
SODIUM SERPL-SCNC: 138 MMOL/L (ref 136–145)
TRIGL SERPL-MCNC: 211 MG/DL (ref 0–150)
TSH SERPL DL<=0.005 MIU/L-ACNC: 1.14 UIU/ML (ref 0.27–4.2)
VLDLC SERPL CALC-MCNC: 35 MG/DL (ref 5–40)
WBC # BLD AUTO: 4.18 10*3/MM3 (ref 3.4–10.8)

## 2023-02-16 DIAGNOSIS — R79.89 ELEVATED LIVER FUNCTION TESTS: Primary | ICD-10-CM

## 2023-02-17 RX ORDER — EZETIMIBE 10 MG/1
TABLET ORAL
Qty: 90 TABLET | Refills: 1 | Status: SHIPPED | OUTPATIENT
Start: 2023-02-17

## 2023-02-17 NOTE — TELEPHONE ENCOUNTER
Rx Refill Note  Requested Prescriptions     Pending Prescriptions Disp Refills   • ezetimibe (ZETIA) 10 MG tablet [Pharmacy Med Name: EZETIMIBE 10 MG TABLET] 90 tablet 1     Sig: TAKE 1 TABLET BY MOUTH EVERY DAY      Last office visit with prescribing clinician: 2/14/2023   Last telemedicine visit with prescribing clinician: 8/15/2023   Next office visit with prescribing clinician: 8/15/2023                         Would you like a call back once the refill request has been completed: [] Yes [] No    If the office needs to give you a call back, can they leave a voicemail: [] Yes [] No    Betzaida Mojica MA  02/17/23, 08:00 EST

## 2023-06-10 DIAGNOSIS — F41.1 GENERALIZED ANXIETY DISORDER: ICD-10-CM

## 2023-06-11 RX ORDER — BUSPIRONE HYDROCHLORIDE 15 MG/1
15 TABLET ORAL 2 TIMES DAILY
Qty: 180 TABLET | Refills: 1 | Status: SHIPPED | OUTPATIENT
Start: 2023-06-11

## 2023-08-15 ENCOUNTER — OFFICE VISIT (OUTPATIENT)
Dept: INTERNAL MEDICINE | Facility: CLINIC | Age: 38
End: 2023-08-15
Payer: OTHER GOVERNMENT

## 2023-08-15 VITALS
RESPIRATION RATE: 16 BRPM | OXYGEN SATURATION: 97 % | WEIGHT: 217 LBS | HEART RATE: 93 BPM | DIASTOLIC BLOOD PRESSURE: 76 MMHG | SYSTOLIC BLOOD PRESSURE: 110 MMHG | TEMPERATURE: 97.6 F | BODY MASS INDEX: 29.39 KG/M2 | HEIGHT: 72 IN

## 2023-08-15 DIAGNOSIS — I10 ESSENTIAL (PRIMARY) HYPERTENSION: Primary | ICD-10-CM

## 2023-08-15 DIAGNOSIS — R73.03 PREDIABETES: Chronic | ICD-10-CM

## 2023-08-15 DIAGNOSIS — E78.2 MIXED HYPERLIPIDEMIA: Chronic | ICD-10-CM

## 2023-08-15 DIAGNOSIS — F41.1 GENERALIZED ANXIETY DISORDER: ICD-10-CM

## 2023-08-15 PROCEDURE — 99214 OFFICE O/P EST MOD 30 MIN: CPT | Performed by: INTERNAL MEDICINE

## 2023-08-15 RX ORDER — CITALOPRAM 40 MG/1
40 TABLET ORAL DAILY
Qty: 90 TABLET | Refills: 1 | Status: SHIPPED | OUTPATIENT
Start: 2023-08-15

## 2023-08-15 RX ORDER — LISINOPRIL 20 MG/1
20 TABLET ORAL DAILY
Qty: 90 TABLET | Refills: 1 | Status: SHIPPED | OUTPATIENT
Start: 2023-08-15

## 2023-08-15 RX ORDER — BUSPIRONE HYDROCHLORIDE 15 MG/1
30 TABLET ORAL 2 TIMES DAILY
Qty: 360 TABLET | Refills: 1 | Status: SHIPPED | OUTPATIENT
Start: 2023-08-15

## 2023-08-15 RX ORDER — ATORVASTATIN CALCIUM 80 MG/1
80 TABLET, FILM COATED ORAL DAILY
Qty: 90 TABLET | Refills: 3 | Status: SHIPPED | OUTPATIENT
Start: 2023-08-15

## 2023-08-15 RX ORDER — EZETIMIBE 10 MG/1
10 TABLET ORAL DAILY
Qty: 90 TABLET | Refills: 3 | Status: SHIPPED | OUTPATIENT
Start: 2023-08-15

## 2023-08-15 NOTE — PROGRESS NOTES
"Chief Complaint  Follow-up, Hypertension, Hyperlipidemia, and Anxiety    Subjective          Kamlesh Denis presents to [facility] for follow up and med refills. Pt taking all medications daily as prescribed with good reported compliance. No issues or side effects with meds.       Objective   Vital Signs:     /76   Pulse 93   Temp 97.6 øF (36.4 øC)   Resp 16   Ht 182.9 cm (72\")   Wt 98.4 kg (217 lb)   SpO2 97%   BMI 29.43 kg/mý     Physical Exam  Vitals and nursing note reviewed.   Constitutional:       General: He is not in acute distress.     Appearance: Normal appearance.   Cardiovascular:      Rate and Rhythm: Normal rate and regular rhythm.      Pulses: Normal pulses.      Heart sounds: Normal heart sounds. No murmur heard.  Pulmonary:      Effort: Pulmonary effort is normal. No respiratory distress.      Breath sounds: Normal breath sounds.   Abdominal:      General: Abdomen is flat. Bowel sounds are normal.      Palpations: Abdomen is soft.      Tenderness: There is no abdominal tenderness.   Musculoskeletal:      Right lower leg: No edema.      Left lower leg: No edema.   Neurological:      Mental Status: He is alert and oriented to person, place, and time. Mental status is at baseline.   Psychiatric:         Mood and Affect: Mood normal.         Behavior: Behavior normal.        Result Review :   The following data was reviewed by: Dionne Dye MD on 08/15/2023:  CMP          8/23/2022    14:29 2/14/2023    11:10   CMP   Glucose 86  102    BUN 12  11    Creatinine 0.94  1.05    Sodium 142  138    Potassium 4.7  5.0    Chloride 100  99    Calcium 9.7  9.7    Total Protein 7.9  7.7    Albumin 4.8  4.8    Globulin 3.1  2.9    Total Bilirubin 0.5  0.6    Alkaline Phosphatase 102  99    AST (SGOT) 42  48    ALT (SGPT) 55  55    BUN/Creatinine Ratio 13  10.5      CBC w/diff          2/14/2023    11:10   CBC w/Diff   WBC 4.18    RBC 5.10  "   Hemoglobin 15.6    Hematocrit 45.9    MCV 90.0    MCH 30.6    MCHC 34.0    RDW 12.7    Platelets 239    Neutrophil Rel % 56.2    Lymphocyte Rel % 25.8    Monocyte Rel % 8.9    Eosinophil Rel % 7.9    Basophil Rel % 1.0      Lipid Panel          8/23/2022    14:29 2/14/2023    11:10   Lipid Panel   Total Cholesterol 227  166    Triglycerides 313  211    HDL Cholesterol 48  52    VLDL Cholesterol 55  35    LDL Cholesterol  124  79      TSH          2/14/2023    11:10   TSH   TSH 1.140      Most Recent A1C          2/14/2023    11:10   HGBA1C Most Recent   Hemoglobin A1C 5.80           Assessment and Plan      Diagnoses and all orders for this visit:    1. Essential (primary) hypertension (Primary)  Assessment & Plan:  CONTROLLED  - BP in good range today in office  - Cont on lisinopril 20 mg daily--> refills sent  - start checking BP at home intermittently, call if values trend outside of goal range      Orders:  -     lisinopril (PRINIVIL,ZESTRIL) 20 MG tablet; Take 1 tablet by mouth Daily.  Dispense: 90 tablet; Refill: 1  -     Comprehensive Metabolic Panel    2. Mixed hyperlipidemia  Assessment & Plan:  CONTROLLED  - FLP today for ongoing monitoring  - cont on current high intensity statin + zetia, will adjust as needed based on labs  - cont with good diet and lifestyle changes including increased exercise, low chol and low fat diet, and increased fiber      Orders:  -     atorvastatin (LIPITOR) 80 MG tablet; Take 1 tablet by mouth Daily.  Dispense: 90 tablet; Refill: 3  -     ezetimibe (ZETIA) 10 MG tablet; Take 1 tablet by mouth Daily.  Dispense: 90 tablet; Refill: 3  -     Lipid Panel  -     Comprehensive Metabolic Panel    3. Prediabetes  -     Hemoglobin A1c  -     Comprehensive Metabolic Panel    4. Generalized anxiety disorder  Assessment & Plan:  IMPROVED  - cont on celexa at 40 mg daily--> refills sent  - cont on buspar, currently taking 30 mg qam and 15 mg qhs-->Will refill with Rx for 30 mg BID to give  pt option to increase nighttime dose as needed  - Reviewed potential side effects of medication including sexual performance changes, insomnia, fatigue, weight changes. Pt tolerating well without any issues.       Orders:  -     busPIRone (BUSPAR) 15 MG tablet; Take 2 tablets by mouth 2 (Two) Times a Day.  Dispense: 360 tablet; Refill: 1  -     citalopram (CeleXA) 40 MG tablet; Take 1 tablet by mouth Daily.  Dispense: 90 tablet; Refill: 1        Follow Up   Return in about 6 months (around 2/15/2024) for Annual physical.    Patient was given instructions and counseling regarding his condition or for health maintenance advice. Please see specific information pulled into the AVS if appropriate.     Ashlie Dye MD  [facility]  Phone: [phone]  Fax: [phone]

## 2023-08-15 NOTE — ASSESSMENT & PLAN NOTE
IMPROVED  - cont on celexa at 40 mg daily--> refills sent  - cont on buspar, currently taking 30 mg qam and 15 mg qhs-->Will refill with Rx for 30 mg BID to give pt option to increase nighttime dose as needed  - Reviewed potential side effects of medication including sexual performance changes, insomnia, fatigue, weight changes. Pt tolerating well without any issues.

## 2023-08-16 LAB
ALBUMIN SERPL-MCNC: 4.8 G/DL (ref 3.5–5.2)
ALBUMIN/GLOB SERPL: 1.8 G/DL
ALP SERPL-CCNC: 104 U/L (ref 39–117)
ALT SERPL-CCNC: 69 U/L (ref 1–41)
AST SERPL-CCNC: 45 U/L (ref 1–40)
BILIRUB SERPL-MCNC: 0.4 MG/DL (ref 0–1.2)
BUN SERPL-MCNC: 19 MG/DL (ref 6–20)
BUN/CREAT SERPL: 18.6 (ref 7–25)
CALCIUM SERPL-MCNC: 9.9 MG/DL (ref 8.6–10.5)
CHLORIDE SERPL-SCNC: 100 MMOL/L (ref 98–107)
CHOLEST SERPL-MCNC: 186 MG/DL (ref 0–200)
CO2 SERPL-SCNC: 26.1 MMOL/L (ref 22–29)
CREAT SERPL-MCNC: 1.02 MG/DL (ref 0.76–1.27)
EGFRCR SERPLBLD CKD-EPI 2021: 95.9 ML/MIN/1.73
GLOBULIN SER CALC-MCNC: 2.7 GM/DL
GLUCOSE SERPL-MCNC: 88 MG/DL (ref 65–99)
HBA1C MFR BLD: 5.8 % (ref 4.8–5.6)
HDLC SERPL-MCNC: 48 MG/DL (ref 40–60)
LDLC SERPL CALC-MCNC: 70 MG/DL (ref 0–100)
POTASSIUM SERPL-SCNC: 4.2 MMOL/L (ref 3.5–5.2)
PROT SERPL-MCNC: 7.5 G/DL (ref 6–8.5)
SODIUM SERPL-SCNC: 137 MMOL/L (ref 136–145)
TRIGL SERPL-MCNC: 439 MG/DL (ref 0–150)
VLDLC SERPL CALC-MCNC: 68 MG/DL (ref 5–40)

## 2023-10-09 ENCOUNTER — TELEPHONE (OUTPATIENT)
Dept: INTERNAL MEDICINE | Facility: CLINIC | Age: 38
End: 2023-10-09
Payer: OTHER GOVERNMENT

## 2023-10-09 NOTE — TELEPHONE ENCOUNTER
L/M FOR PT TO RETURN MY CALL TO R/S THE APPT FROM DR SAVAGE DUE TO HER LEAVING [facility] TO ANOTHER PROVIDER IN OUR OFFICE   I DID ALSO GIVE THE  PHONE NUMBER TO PT TO CALL TO SCHEDULE A NEW PATIENT OUTSIDE OF OUR OFFICE  HUB CAN RELAY MESSAGE

## 2023-10-11 DIAGNOSIS — E78.2 MIXED HYPERLIPIDEMIA: Chronic | ICD-10-CM

## 2023-10-11 RX ORDER — ATORVASTATIN CALCIUM 80 MG/1
80 TABLET, FILM COATED ORAL DAILY
Qty: 90 TABLET | Refills: 3 | Status: SHIPPED | OUTPATIENT
Start: 2023-10-11

## 2023-10-11 NOTE — TELEPHONE ENCOUNTER
Caller: Cira Kamlesh    Relationship: Self    Best call back number: [phone]    Requested Prescriptions:   Requested Prescriptions     Pending Prescriptions Disp Refills    atorvastatin (LIPITOR) 80 MG tablet 90 tablet 3     Sig: Take 1 tablet by mouth Daily.        Pharmacy where request should be sent: [facility] [address] [place] [address] - [phone] PH - [phone] FX     Last office visit with prescribing clinician: 8/15/2023   Last telemedicine visit with prescribing clinician: Visit date not found   Next office visit with prescribing clinician: Visit date not found     Additional details provided by patient:     Does the patient have less than a 3 day supply:  [] Yes  [] No    Would you like a call back once the refill request has been completed: [] Yes [] No    If the office needs to give you a call back, can they leave a voicemail: [] Yes [] No    Denice Bowling Rep   10/11/23 10:12 EDT

## 2024-02-23 ENCOUNTER — TELEPHONE (OUTPATIENT)
Dept: URGENT CARE | Facility: CLINIC | Age: 39
End: 2024-02-23
Payer: OTHER GOVERNMENT

## 2024-02-23 NOTE — TELEPHONE ENCOUNTER
Caller: Kamlesh Denis    Relationship: Self    Best call back number: [phone]     What is the best time to reach you: ANYTIME    Who are you requesting to speak with (clinical staff, provider,  specific staff member): CLINICAL    What was the call regarding: PATIENT STATED HE HAS HAD TO RESCHEDULE HIS PHYSICAL WITH DR BELLAMY TO MAY DUE TO A FAMILY EMERGENCY.    PATIENT IS REQUESTING TO KNOW WHAT HE SHOULD DO FOR ANY MEDICATION REFILLS UNTIL THEN SINCE HE HAS NOT SEEN DR BELLAMY YET.    PLEASE CALL TO DISCUSS AND ADVISE.

## 2024-03-13 DIAGNOSIS — F41.1 GENERALIZED ANXIETY DISORDER: ICD-10-CM

## 2024-03-13 RX ORDER — CITALOPRAM 40 MG/1
40 TABLET ORAL DAILY
Qty: 90 TABLET | Refills: 1 | Status: SHIPPED | OUTPATIENT
Start: 2024-03-13

## 2024-03-13 RX ORDER — CITALOPRAM 40 MG/1
40 TABLET ORAL DAILY
Qty: 90 TABLET | Refills: 1 | Status: SHIPPED | OUTPATIENT
Start: 2024-03-13 | End: 2024-03-13 | Stop reason: SDUPTHER

## 2024-03-13 NOTE — TELEPHONE ENCOUNTER
Caller: Kamlesh Denis    Relationship: Self    Best call back number: [phone]    Requested Prescriptions:   Requested Prescriptions     Pending Prescriptions Disp Refills    citalopram (CeleXA) 40 MG tablet 90 tablet 1     Sig: Take 1 tablet by mouth Daily.        Pharmacy where request should be sent: [facility] [address] [place] [address] - [phone] PH - [phone] FX     Last office visit with prescribing clinician: 8/15/2023   Last telemedicine visit with prescribing clinician: Visit date not found   Next office visit with prescribing clinician: Visit date not found     Additional details provided by patient: STATED THAT THEY JUST TOOK THE LAST OF THEIR MEDICATION TODAY    Does the patient have less than a 3 day supply:  [x] Yes  [] No    Would you like a call back once the refill request has been completed: [] Yes [x] No    If the office needs to give you a call back, can they leave a voicemail: [] Yes [x] No    Denice Gregorio Rep   03/13/24 10:17 EDT

## 2024-03-13 NOTE — TELEPHONE ENCOUNTER
Patient had an appt scheduled with you in Feb, but canceled.  His establish care is now in May.  He last saw Dr. Dye in 8/2023.      Rx Refill Note  Requested Prescriptions     Pending Prescriptions Disp Refills    citalopram (CeleXA) 40 MG tablet 90 tablet 1     Sig: Take 1 tablet by mouth Daily.      Last office visit with prescribing clinician: 8/15/2023   Last telemedicine visit with prescribing clinician: Visit date not found   Next office visit with prescribing clinician: Visit date not found                         Would you like a call back once the refill request has been completed: [] Yes [] No    If the office needs to give you a call back, can they leave a voicemail: [] Yes [] No    Allison Marie MA  03/13/24, 10:29 EDT

## 2024-04-01 ENCOUNTER — TELEPHONE (OUTPATIENT)
Dept: INTERNAL MEDICINE | Facility: CLINIC | Age: 39
End: 2024-04-01

## 2024-04-01 DIAGNOSIS — F41.1 GENERALIZED ANXIETY DISORDER: ICD-10-CM

## 2024-04-01 RX ORDER — BUSPIRONE HYDROCHLORIDE 15 MG/1
30 TABLET ORAL 2 TIMES DAILY
Qty: 360 TABLET | Refills: 1 | Status: SHIPPED | OUTPATIENT
Start: 2024-04-01 | End: 2024-04-02 | Stop reason: SDUPTHER

## 2024-04-01 NOTE — TELEPHONE ENCOUNTER
.    Caller: Kamlesh Denis    Relationship: Self    Best call back number: [phone]    What medication are you requesting:     busPIRone (BUSPAR) 15 MG tablet       What are your current symptoms:     How long have you been experiencing symptoms:     Have you had these symptoms before:    [x] Yes  [] No    Have you been treated for these symptoms before:   [x] Yes  [] No    If a prescription is needed, what is your preferred pharmacy and phone number: [facility] [employer]  [address]  [phone]     Additional notes: PATIENT IS CALLING IN TO REQUEST A REFILL ON HIS     busPIRone (BUSPAR) 15 MG tablet   BUT THIS WAS PRESCRIBED BY DR SAVAGE.  HE WILL BE SEEING DR BELLAMY SOON AS A ESTABLISHED PATIENT.  HE SAYS HE HAS TWO DOSES OF THE MEDICATION REMAINING.

## 2024-04-02 DIAGNOSIS — F41.1 GENERALIZED ANXIETY DISORDER: ICD-10-CM

## 2024-04-02 RX ORDER — BUSPIRONE HYDROCHLORIDE 15 MG/1
30 TABLET ORAL 2 TIMES DAILY
Qty: 120 TABLET | Refills: 1 | Status: SHIPPED | OUTPATIENT
Start: 2024-04-02

## 2024-04-03 DIAGNOSIS — F41.1 GENERALIZED ANXIETY DISORDER: ICD-10-CM

## 2024-04-03 RX ORDER — BUSPIRONE HYDROCHLORIDE 15 MG/1
30 TABLET ORAL 2 TIMES DAILY
Qty: 120 TABLET | Refills: 1 | Status: CANCELLED | OUTPATIENT
Start: 2024-04-03

## 2024-04-03 NOTE — TELEPHONE ENCOUNTER
Caller: Kamlesh Denis    Relationship: Self    Best call back number: [phone]     Requested Prescriptions:   Requested Prescriptions     Pending Prescriptions Disp Refills    busPIRone (BUSPAR) 15 MG tablet 120 tablet 1     Sig: Take 2 tablets by mouth 2 (Two) Times a Day.        Pharmacy where request should be sent: [facility] [address] [place] [address] - [phone]  - [phone]     Last office visit with prescribing clinician: Visit date not found   Last telemedicine visit with prescribing clinician: Visit date not found   Next office visit with prescribing clinician: Visit date not found     Additional details provided by patient: PATIENT STATES THAT PHARMACY IS NOW WANTING THE DOSAGE TO BE CHANGED FROM 2 15 MG TWICE A DAY TO 1 30 MG 2 TIMES A DAY. THIS WOULD BE EASIER FOR THE INSURANCE TO COVER. THE INSURANCE WAS NOT ALLOWING THE REFILL DUE TO DOSING INSTRUCTIONS.     PATIENT IS OUT OF THIS MEDICATION.     Does the patient have less than a 3 day supply:  [x] Yes  [] No    Would you like a call back once the refill request has been completed: [] Yes [x] No    If the office needs to give you a call back, can they leave a voicemail: [x] Yes [] No    Denice Bardales Rep   04/03/24 12:59 EDT

## 2024-04-04 DIAGNOSIS — I10 ESSENTIAL (PRIMARY) HYPERTENSION: ICD-10-CM

## 2024-04-04 RX ORDER — LISINOPRIL 20 MG/1
20 TABLET ORAL DAILY
Qty: 60 TABLET | Refills: 0 | Status: SHIPPED | OUTPATIENT
Start: 2024-04-04

## 2024-04-04 RX ORDER — BUSPIRONE HYDROCHLORIDE 30 MG/1
30 TABLET ORAL 2 TIMES DAILY
Qty: 60 TABLET | Refills: 2 | Status: SHIPPED | OUTPATIENT
Start: 2024-04-04

## 2024-04-04 NOTE — TELEPHONE ENCOUNTER
Rx Refill Note  Requested Prescriptions     Pending Prescriptions Disp Refills    lisinopril (PRINIVIL,ZESTRIL) 20 MG tablet 60 tablet 0     Sig: Take 1 tablet by mouth Daily.      Last office visit with prescribing clinician: Visit date not found   Last telemedicine visit with prescribing clinician: Visit date not found   Next office visit with prescribing clinician: 5/23/2024                         Would you like a call back once the refill request has been completed: [] Yes [] No    If the office needs to give you a call back, can they leave a voicemail: [] Yes [] No    Allison Marie MA  04/04/24, 13:41 EDT

## 2024-04-04 NOTE — TELEPHONE ENCOUNTER
You have a ne pt appt with him the end of May, needs a refill of Buspar.  Pharmacy says insurance wont fill the 15mgs with current directions.   Will you change to 30mg BID

## 2024-05-02 RX ORDER — BUSPIRONE HYDROCHLORIDE 30 MG/1
30 TABLET ORAL 2 TIMES DAILY
Qty: 60 TABLET | Refills: 2 | OUTPATIENT
Start: 2024-05-02

## 2024-05-23 ENCOUNTER — OFFICE VISIT (OUTPATIENT)
Dept: INTERNAL MEDICINE | Facility: CLINIC | Age: 39
End: 2024-05-23
Payer: OTHER GOVERNMENT

## 2024-05-23 VITALS
WEIGHT: 212.4 LBS | OXYGEN SATURATION: 97 % | BODY MASS INDEX: 28.77 KG/M2 | RESPIRATION RATE: 16 BRPM | SYSTOLIC BLOOD PRESSURE: 118 MMHG | HEART RATE: 86 BPM | HEIGHT: 72 IN | DIASTOLIC BLOOD PRESSURE: 72 MMHG

## 2024-05-23 DIAGNOSIS — I10 ESSENTIAL (PRIMARY) HYPERTENSION: Primary | ICD-10-CM

## 2024-05-23 DIAGNOSIS — R73.03 PREDIABETES: Chronic | ICD-10-CM

## 2024-05-23 DIAGNOSIS — E78.2 MIXED HYPERLIPIDEMIA: Chronic | ICD-10-CM

## 2024-05-23 PROCEDURE — 99396 PREV VISIT EST AGE 40-64: CPT | Performed by: INTERNAL MEDICINE

## 2024-05-23 PROCEDURE — 99214 OFFICE O/P EST MOD 30 MIN: CPT | Performed by: INTERNAL MEDICINE

## 2024-05-24 ENCOUNTER — TELEPHONE (OUTPATIENT)
Dept: INTERNAL MEDICINE | Facility: CLINIC | Age: 39
End: 2024-05-24

## 2024-05-24 DIAGNOSIS — I10 ESSENTIAL (PRIMARY) HYPERTENSION: Primary | ICD-10-CM

## 2024-05-24 DIAGNOSIS — R74.8 ELEVATED LIVER ENZYMES: ICD-10-CM

## 2024-05-24 LAB
ALBUMIN SERPL-MCNC: 4.8 G/DL (ref 3.5–5.2)
ALBUMIN/GLOB SERPL: 1.7 G/DL
ALP SERPL-CCNC: 109 U/L (ref 39–117)
ALT SERPL-CCNC: 59 U/L (ref 1–41)
AST SERPL-CCNC: 60 U/L (ref 1–40)
BILIRUB SERPL-MCNC: 0.7 MG/DL (ref 0–1.2)
BUN SERPL-MCNC: 12 MG/DL (ref 6–20)
BUN/CREAT SERPL: 11.7 (ref 7–25)
CALCIUM SERPL-MCNC: 9.6 MG/DL (ref 8.6–10.5)
CHLORIDE SERPL-SCNC: 101 MMOL/L (ref 98–107)
CHOLEST SERPL-MCNC: 155 MG/DL (ref 0–200)
CO2 SERPL-SCNC: 25.5 MMOL/L (ref 22–29)
CREAT SERPL-MCNC: 1.03 MG/DL (ref 0.76–1.27)
EGFRCR SERPLBLD CKD-EPI 2021: 94.2 ML/MIN/1.73
GLOBULIN SER CALC-MCNC: 2.9 GM/DL
GLUCOSE SERPL-MCNC: 94 MG/DL (ref 65–99)
HBA1C MFR BLD: 5.8 % (ref 4.8–5.6)
HDLC SERPL-MCNC: 51 MG/DL (ref 40–60)
LDLC SERPL CALC-MCNC: 82 MG/DL (ref 0–100)
POTASSIUM SERPL-SCNC: 4.7 MMOL/L (ref 3.5–5.2)
PROT SERPL-MCNC: 7.7 G/DL (ref 6–8.5)
SODIUM SERPL-SCNC: 139 MMOL/L (ref 136–145)
TRIGL SERPL-MCNC: 122 MG/DL (ref 0–150)
VLDLC SERPL CALC-MCNC: 22 MG/DL (ref 5–40)

## 2024-05-24 NOTE — TELEPHONE ENCOUNTER
Hub staff attempted to follow warm transfer process and was unsuccessful     Caller: Kamlesh Denis    Relationship to patient: Self    Best call back number: [phone]     Patient is needing: TO SPEAK WITH RAMANA REGARDING TEST RESULTS. PLEASE CALL TO ADVISE.

## 2024-05-24 NOTE — PROGRESS NOTES
"Chief Complaint   Patient presents with    [facility]     Previous Dr. Dye patient    Annual Exam     Wellness Exam       Subjective   Kamlesh Denis is a 38 y.o. male here for   Chief Complaint   Patient presents with    [facility]     Previous Dr. Dye patient    Annual Exam     Wellness Exam   .    History of Present Illness     The following portions of the patient's history were reviewed and updated as appropriate: allergies, current medications, past family history, past medical history, past social history, past surgical history, and problem list.  Here for the physical today.  Overall doing very well.  No specific concerns.  We talked about diet and exercise habits.  Discussed prevention recommendations.   Review of Systems   All other systems reviewed and are negative.      Body mass index is 28.81 kg/m².   Vitals:    05/23/24 1517   BP: 118/72   Pulse: 86   Resp: 16   SpO2: 97%   Weight: 96.3 kg (212 lb 6.4 oz)   Height: 182.9 cm (72\")        Objective   Physical Exam  Vitals and nursing note reviewed.   Constitutional:       General: He is not in acute distress.     Appearance: Normal appearance. He is not toxic-appearing.   HENT:      Head: Normocephalic and atraumatic.      Right Ear: Tympanic membrane, ear canal and external ear normal.      Left Ear: Tympanic membrane, ear canal and external ear normal.      Nose: Nose normal. No congestion or rhinorrhea.      Mouth/Throat:      Mouth: Mucous membranes are moist.      Pharynx: No oropharyngeal exudate.   Eyes:      General: No scleral icterus.        Right eye: No discharge.         Left eye: No discharge.      Extraocular Movements: Extraocular movements intact.      Conjunctiva/sclera: Conjunctivae normal.      Pupils: Pupils are equal, round, and reactive to light.   Cardiovascular:      Rate and Rhythm: Normal rate and regular rhythm.      Pulses: Normal pulses.      Heart sounds: Normal heart sounds. No murmur heard.  Pulmonary: "      Effort: Pulmonary effort is normal. No respiratory distress.      Breath sounds: Normal breath sounds. No wheezing.   Abdominal:      General: Abdomen is flat. Bowel sounds are normal. There is no distension.      Palpations: Abdomen is soft.      Tenderness: There is no abdominal tenderness. There is no guarding.   Musculoskeletal:         General: No swelling, tenderness or deformity. Normal range of motion.      Cervical back: Normal range of motion and neck supple. No rigidity or tenderness.   Lymphadenopathy:      Cervical: No cervical adenopathy.   Skin:     General: Skin is warm.      Capillary Refill: Capillary refill takes less than 2 seconds.   Neurological:      General: No focal deficit present.      Mental Status: He is alert and oriented to person, place, and time. Mental status is at baseline.      Cranial Nerves: No cranial nerve deficit.      Gait: Gait normal.   Psychiatric:         Mood and Affect: Mood normal.         Behavior: Behavior normal.         Thought Content: Thought content normal.         Judgment: Judgment normal.         Lab Results   Component Value Date    HGBA1C 5.80 (H) 05/23/2024    TSH 1.140 02/14/2023        Health Maintenance   Topic Date Due    BMI FOLLOWUP  Never done    COVID-19 Vaccine (1 - 2023-24 season) Never done    INFLUENZA VACCINE  08/01/2024    ANNUAL PHYSICAL  05/23/2025    LIPID PANEL  05/23/2025    TDAP/TD VACCINES (2 - Tdap) 02/02/2031    HEPATITIS C SCREENING  Completed    Pneumococcal Vaccine 0-64  Aged Out        Assessment & Plan   Problems Addressed this Visit       Essential (primary) hypertension - Primary    Relevant Orders    Comprehensive metabolic panel (Completed)    Mixed hyperlipidemia (Chronic)    Relevant Orders    Lipid panel (Completed)    Prediabetes (Chronic)    Relevant Orders    Hemoglobin A1c (Completed)     Diagnoses         Codes Comments    Essential (primary) hypertension    -  Primary ICD-10-CM: I10  ICD-9-CM: 401.9     Mixed  hyperlipidemia     ICD-10-CM: E78.2  ICD-9-CM: 272.2     Prediabetes     ICD-10-CM: R73.03  ICD-9-CM: 790.29           Annual exam- check labs as above, discussed routine health maintenance, screening, etc. F/u pending lab results otherwise 6 months.  HLD - check FLP for ongoing monitoring, continue atorvastatin 80mg daily with zetia 10mg daily  Anxiety- controlled continue buspar 30mg BID and celexa 40mg daily  HTN controlled continue lisinopril 20mg daily     Roscoe Roy MD  [facility]  [address]  Phone: [phone]

## 2024-07-07 RX ORDER — BUSPIRONE HYDROCHLORIDE 30 MG/1
30 TABLET ORAL 2 TIMES DAILY
Qty: 60 TABLET | Refills: 0 | Status: SHIPPED | OUTPATIENT
Start: 2024-07-07

## 2024-08-05 RX ORDER — BUSPIRONE HYDROCHLORIDE 30 MG/1
30 TABLET ORAL 2 TIMES DAILY
Qty: 60 TABLET | Refills: 0 | Status: SHIPPED | OUTPATIENT
Start: 2024-08-05

## 2024-08-08 DIAGNOSIS — E78.2 MIXED HYPERLIPIDEMIA: Chronic | ICD-10-CM

## 2024-08-08 RX ORDER — EZETIMIBE 10 MG/1
10 TABLET ORAL DAILY
Qty: 90 TABLET | Refills: 3 | Status: SHIPPED | OUTPATIENT
Start: 2024-08-08

## 2024-08-08 NOTE — TELEPHONE ENCOUNTER
Caller: Kamlesh Denis    Relationship: Self    Best call back number: [phone]    Requested Prescriptions:   Requested Prescriptions     Pending Prescriptions Disp Refills    ezetimibe (ZETIA) 10 MG tablet 90 tablet 3     Sig: Take 1 tablet by mouth Daily.        Pharmacy where request should be sent: [address] [facility] [phone] [facility] [phone]      Last office visit with prescribing clinician: 5/23/2024   Last telemedicine visit with prescribing clinician: Visit date not found   Next office visit with prescribing clinician: 11/27/2024     Additional details provided by patient: STATED THAT THEY HAVE A COUPLE OF DAYS REMAINING ON THE MEDICATION    Does the patient have less than a 3 day supply:  [x] Yes  [] No    Would you like a call back once the refill request has been completed: [] Yes [x] No    If the office needs to give you a call back, can they leave a voicemail: [] Yes [x] No    Denice Gregorio   08/08/24 15:01 EDT

## 2024-09-03 RX ORDER — BUSPIRONE HYDROCHLORIDE 30 MG/1
30 TABLET ORAL 2 TIMES DAILY
Qty: 60 TABLET | Refills: 0 | Status: SHIPPED | OUTPATIENT
Start: 2024-09-03

## 2024-09-21 DIAGNOSIS — I10 ESSENTIAL (PRIMARY) HYPERTENSION: ICD-10-CM

## 2024-09-21 DIAGNOSIS — F41.1 GENERALIZED ANXIETY DISORDER: ICD-10-CM

## 2024-09-23 RX ORDER — CITALOPRAM HYDROBROMIDE 40 MG/1
40 TABLET ORAL DAILY
Qty: 90 TABLET | Refills: 0 | Status: SHIPPED | OUTPATIENT
Start: 2024-09-23

## 2024-09-23 RX ORDER — LISINOPRIL 20 MG/1
20 TABLET ORAL DAILY
Qty: 60 TABLET | Refills: 0 | Status: SHIPPED | OUTPATIENT
Start: 2024-09-23

## 2024-11-16 DIAGNOSIS — I10 ESSENTIAL (PRIMARY) HYPERTENSION: ICD-10-CM

## 2024-11-18 RX ORDER — LISINOPRIL 20 MG/1
20 TABLET ORAL DAILY
Qty: 60 TABLET | Refills: 0 | Status: SHIPPED | OUTPATIENT
Start: 2024-11-18

## 2024-11-27 ENCOUNTER — OFFICE VISIT (OUTPATIENT)
Dept: INTERNAL MEDICINE | Facility: CLINIC | Age: 39
End: 2024-11-27
Payer: OTHER GOVERNMENT

## 2024-11-27 VITALS
RESPIRATION RATE: 16 BRPM | SYSTOLIC BLOOD PRESSURE: 128 MMHG | BODY MASS INDEX: 29.34 KG/M2 | HEART RATE: 90 BPM | DIASTOLIC BLOOD PRESSURE: 84 MMHG | OXYGEN SATURATION: 98 % | HEIGHT: 72 IN | WEIGHT: 216.6 LBS

## 2024-11-27 DIAGNOSIS — E78.2 MIXED HYPERLIPIDEMIA: ICD-10-CM

## 2024-11-27 DIAGNOSIS — I10 ESSENTIAL (PRIMARY) HYPERTENSION: Primary | ICD-10-CM

## 2024-11-27 DIAGNOSIS — R74.8 ELEVATED LIVER ENZYMES: ICD-10-CM

## 2024-11-27 DIAGNOSIS — R73.03 PREDIABETES: ICD-10-CM

## 2024-11-27 PROCEDURE — 99214 OFFICE O/P EST MOD 30 MIN: CPT | Performed by: INTERNAL MEDICINE

## 2024-12-02 RX ORDER — BUSPIRONE HYDROCHLORIDE 30 MG/1
30 TABLET ORAL 2 TIMES DAILY
Qty: 60 TABLET | Refills: 0 | Status: SHIPPED | OUTPATIENT
Start: 2024-12-02

## 2024-12-06 NOTE — PROGRESS NOTES
"Chief Complaint  Follow-up (6 mo f/u)    Subjective        Kamlesh Denis presents to [facility]  History of Present Illness  Here for f/u HTN, HLD, mood d/o  Taking all meds as prescribed, no side effects    Objective   Vital Signs:  /84   Pulse 90   Resp 16   Ht 182.9 cm (72\")   Wt 98.2 kg (216 lb 9.6 oz)   SpO2 98%   BMI 29.38 kg/m²   Estimated body mass index is 29.38 kg/m² as calculated from the following:    Height as of this encounter: 182.9 cm (72\").    Weight as of this encounter: 98.2 kg (216 lb 9.6 oz).    Physical Exam  Vitals and nursing note reviewed.   Constitutional:       General: He is not in acute distress.     Appearance: Normal appearance. He is not toxic-appearing.   HENT:      Head: Normocephalic and atraumatic.      Right Ear: External ear normal.      Left Ear: External ear normal.      Nose: Nose normal.   Eyes:      General: No scleral icterus.        Right eye: No discharge.         Left eye: No discharge.      Extraocular Movements: Extraocular movements intact.      Conjunctiva/sclera: Conjunctivae normal.      Pupils: Pupils are equal, round, and reactive to light.   Cardiovascular:      Rate and Rhythm: Normal rate and regular rhythm.      Pulses: Normal pulses.      Heart sounds: Normal heart sounds. No murmur heard.  Pulmonary:      Effort: Pulmonary effort is normal.   Musculoskeletal:         General: Normal range of motion.   Skin:     General: Skin is warm.      Capillary Refill: Capillary refill takes less than 2 seconds.   Neurological:      General: No focal deficit present.      Mental Status: He is alert and oriented to person, place, and time. Mental status is at baseline.      Cranial Nerves: No cranial nerve deficit.      Gait: Gait normal.   Psychiatric:         Mood and Affect: Mood normal.         Behavior: Behavior normal.         Thought Content: Thought content normal.         Judgment: Judgment normal. "        Result Review :  The following data was reviewed by: Roscoe Roy MD on 11/27/2024:  Common labs          5/23/2024    15:51   Common Labs   Glucose 94    BUN 12    Creatinine 1.03    Sodium 139    Potassium 4.7    Chloride 101    Calcium 9.6    Total Protein 7.7    Albumin 4.8    Total Bilirubin 0.7    Alkaline Phosphatase 109    AST (SGOT) 60    ALT (SGPT) 59    Total Cholesterol 155    Triglycerides 122    HDL Cholesterol 51    LDL Cholesterol  82    Hemoglobin A1C 5.80      Data reviewed : prior office notes reviewed           Assessment and Plan   Diagnoses and all orders for this visit:    1. Essential (primary) hypertension (Primary)  -     Comprehensive metabolic panel; Future    2. Mixed hyperlipidemia  -     Lipid panel; Future    3. Elevated liver enzymes  -     Comprehensive metabolic panel; Future    4. Prediabetes  -     Hemoglobin A1c; Future    He will return for fasting blood work at his convenience, likely in Feb due to work schedule  Continue current meds as prescribed, adjust pending lab results        I spent 15 minutes caring for Kamlesh on this date of service. This time includes time spent by me in the following activities:preparing for the visit, reviewing tests, obtaining and/or reviewing a separately obtained history, performing a medically appropriate examination and/or evaluation , counseling and educating the patient/family/caregiver, ordering medications, tests, or procedures, and documenting information in the medical record  Follow Up   F/u 6 months  Patient was given instructions and counseling regarding his condition or for health maintenance advice. Please see specific information pulled into the AVS if appropriate.     Roscoe Roy MD  [facility]  [address] [place] [address]  Phone: [phone]

## 2024-12-18 DIAGNOSIS — F41.1 GENERALIZED ANXIETY DISORDER: ICD-10-CM

## 2024-12-18 RX ORDER — CITALOPRAM HYDROBROMIDE 40 MG/1
40 TABLET ORAL DAILY
Qty: 90 TABLET | Refills: 0 | Status: SHIPPED | OUTPATIENT
Start: 2024-12-18

## 2024-12-30 RX ORDER — BUSPIRONE HYDROCHLORIDE 30 MG/1
30 TABLET ORAL 2 TIMES DAILY
Qty: 60 TABLET | Refills: 0 | Status: SHIPPED | OUTPATIENT
Start: 2024-12-30

## 2024-12-30 NOTE — TELEPHONE ENCOUNTER
Rx Refill Note  Requested Prescriptions     Pending Prescriptions Disp Refills    busPIRone (BUSPAR) 30 MG tablet [Pharmacy Med Name: busPIRone HCl 30 MG Oral Tablet] 60 tablet 0     Sig: Take 1 tablet by mouth twice daily      Last office visit with prescribing clinician: 11/27/2024   Last telemedicine visit with prescribing clinician: Visit date not found   Next office visit with prescribing clinician: Visit date not found                         Would you like a call back once the refill request has been completed: [] Yes [] No    If the office needs to give you a call back, can they leave a voicemail: [] Yes [] No    Allison Marie MA  12/30/24, 07:23 EST

## 2025-01-11 DIAGNOSIS — I10 ESSENTIAL (PRIMARY) HYPERTENSION: ICD-10-CM

## 2025-01-11 RX ORDER — LISINOPRIL 20 MG/1
20 TABLET ORAL DAILY
Qty: 60 TABLET | Refills: 0 | Status: SHIPPED | OUTPATIENT
Start: 2025-01-11

## 2025-01-11 RX ORDER — BUSPIRONE HYDROCHLORIDE 30 MG/1
30 TABLET ORAL 2 TIMES DAILY
Qty: 60 TABLET | Refills: 0 | Status: SHIPPED | OUTPATIENT
Start: 2025-01-11

## 2025-01-15 DIAGNOSIS — E78.2 MIXED HYPERLIPIDEMIA: Chronic | ICD-10-CM

## 2025-01-15 RX ORDER — ATORVASTATIN CALCIUM 80 MG/1
80 TABLET, FILM COATED ORAL DAILY
Qty: 90 TABLET | Refills: 0 | Status: SHIPPED | OUTPATIENT
Start: 2025-01-15

## 2025-01-15 NOTE — TELEPHONE ENCOUNTER
Caller: Kamlesh Denis    Relationship: Self    Best call back number: [phone]     Requested Prescriptions:   Requested Prescriptions     Pending Prescriptions Disp Refills    atorvastatin (LIPITOR) 80 MG tablet 90 tablet 3     Sig: Take 1 tablet by mouth Daily.        Pharmacy where request should be sent: [address] [facility] [phone] [facility] [phone]      Last office visit with prescribing clinician: 11/27/2024   Last telemedicine visit with prescribing clinician: Visit date not found   Next office visit with prescribing clinician: Visit date not found     Additional details provided by patient: PLEASE CALL TO FOLLOW UP IF NEEDED    Does the patient have less than a 3 day supply:  [x] Yes  [] No        Denice Roy Rep   01/15/25 09:44 EST

## 2025-01-29 ENCOUNTER — TELEPHONE (OUTPATIENT)
Dept: INTERNAL MEDICINE | Facility: CLINIC | Age: 40
End: 2025-01-29
Payer: OTHER GOVERNMENT

## 2025-01-29 NOTE — TELEPHONE ENCOUNTER
Caller: Kamlesh Denis    Relationship: Self    Best call back number: [phone]    What was the call regarding: STATED THAT THEY HAVE A LAB APPOINTMENT ON 2/5/25 AND THEY ARE NEEDING TO SEE IF THEY CAN POSSIBLE GET A DIFFERENT APPOINTMENT AROUND THAT DAY AS IT IS LOOKING LIKE THEY MAY HAVE TO WORK. PLEASE CALL AND ADVISE

## 2025-02-23 RX ORDER — BUSPIRONE HYDROCHLORIDE 30 MG/1
30 TABLET ORAL 2 TIMES DAILY
Qty: 60 TABLET | Refills: 0 | Status: SHIPPED | OUTPATIENT
Start: 2025-02-23

## 2025-02-25 DIAGNOSIS — I10 ESSENTIAL (PRIMARY) HYPERTENSION: ICD-10-CM

## 2025-02-25 RX ORDER — LISINOPRIL 20 MG/1
20 TABLET ORAL DAILY
Qty: 60 TABLET | Refills: 0 | Status: SHIPPED | OUTPATIENT
Start: 2025-02-25

## 2025-03-15 DIAGNOSIS — F41.1 GENERALIZED ANXIETY DISORDER: ICD-10-CM

## 2025-03-17 RX ORDER — CITALOPRAM HYDROBROMIDE 40 MG/1
40 TABLET ORAL DAILY
Qty: 90 TABLET | Refills: 0 | Status: SHIPPED | OUTPATIENT
Start: 2025-03-17

## 2025-03-24 RX ORDER — BUSPIRONE HYDROCHLORIDE 30 MG/1
30 TABLET ORAL 2 TIMES DAILY
Qty: 60 TABLET | Refills: 0 | Status: SHIPPED | OUTPATIENT
Start: 2025-03-24

## 2025-04-04 ENCOUNTER — TELEPHONE (OUTPATIENT)
Dept: INTERNAL MEDICINE | Facility: CLINIC | Age: 40
End: 2025-04-04
Payer: OTHER GOVERNMENT

## 2025-04-10 DIAGNOSIS — E78.2 MIXED HYPERLIPIDEMIA: Chronic | ICD-10-CM

## 2025-04-10 RX ORDER — ATORVASTATIN CALCIUM 80 MG/1
80 TABLET, FILM COATED ORAL DAILY
Qty: 90 TABLET | Refills: 0 | Status: SHIPPED | OUTPATIENT
Start: 2025-04-10

## 2025-04-19 RX ORDER — BUSPIRONE HYDROCHLORIDE 30 MG/1
30 TABLET ORAL 2 TIMES DAILY
Qty: 60 TABLET | Refills: 0 | Status: SHIPPED | OUTPATIENT
Start: 2025-04-19

## 2025-05-05 DIAGNOSIS — I10 ESSENTIAL (PRIMARY) HYPERTENSION: ICD-10-CM

## 2025-05-05 RX ORDER — LISINOPRIL 20 MG/1
20 TABLET ORAL DAILY
Qty: 60 TABLET | Refills: 0 | Status: SHIPPED | OUTPATIENT
Start: 2025-05-05

## 2025-05-08 ENCOUNTER — TELEPHONE (OUTPATIENT)
Dept: INTERNAL MEDICINE | Facility: CLINIC | Age: 40
End: 2025-05-08

## 2025-05-08 NOTE — TELEPHONE ENCOUNTER
Okay for hub to read. I called and left voicemail for patient asking to give us a call back. We are needing to reschedule his appointment for today due to Dr harvey being out sick    
Left arm;
Ambulatory

## 2025-05-16 RX ORDER — BUSPIRONE HYDROCHLORIDE 30 MG/1
30 TABLET ORAL 2 TIMES DAILY
Qty: 60 TABLET | Refills: 0 | Status: SHIPPED | OUTPATIENT
Start: 2025-05-16

## 2025-05-16 NOTE — TELEPHONE ENCOUNTER
Rx Refill Note  Requested Prescriptions     Pending Prescriptions Disp Refills    busPIRone (BUSPAR) 30 MG tablet [Pharmacy Med Name: busPIRone HCl 30 MG Oral Tablet] 60 tablet 0     Sig: Take 1 tablet by mouth twice daily      Last office visit with prescribing clinician: 11/27/2024   Last telemedicine visit with prescribing clinician: Visit date not found   Next office visit with prescribing clinician: 6/27/2025                         Would you like a call back once the refill request has been completed: [] Yes [] No    If the office needs to give you a call back, can they leave a voicemail: [] Yes [] No    Kenzie Haywood MA  05/16/25, 07:22 EDT

## 2025-06-10 DIAGNOSIS — F41.1 GENERALIZED ANXIETY DISORDER: ICD-10-CM

## 2025-06-10 RX ORDER — CITALOPRAM HYDROBROMIDE 40 MG/1
40 TABLET ORAL DAILY
Qty: 90 TABLET | Refills: 0 | Status: SHIPPED | OUTPATIENT
Start: 2025-06-10

## 2025-06-10 NOTE — TELEPHONE ENCOUNTER
Rx Refill Note  Requested Prescriptions     Pending Prescriptions Disp Refills    citalopram (CeleXA) 40 MG tablet [Pharmacy Med Name: Citalopram Hydrobromide 40 MG Oral Tablet] 90 tablet 0     Sig: Take 1 tablet by mouth once daily      Last office visit with prescribing clinician: 11/27/2024   Last telemedicine visit with prescribing clinician: Visit date not found   Next office visit with prescribing clinician: 6/27/2025                         Would you like a call back once the refill request has been completed: [] Yes [] No    If the office needs to give you a call back, can they leave a voicemail: [] Yes [] No    Kenzie Haywood MA  06/10/25, 09:32 EDT

## 2025-06-16 RX ORDER — BUSPIRONE HYDROCHLORIDE 30 MG/1
30 TABLET ORAL 2 TIMES DAILY
Qty: 60 TABLET | Refills: 0 | Status: SHIPPED | OUTPATIENT
Start: 2025-06-16 | End: 2025-06-17 | Stop reason: SDUPTHER

## 2025-06-16 NOTE — TELEPHONE ENCOUNTER
Rx Refill Note  Requested Prescriptions     Pending Prescriptions Disp Refills    busPIRone (BUSPAR) 30 MG tablet [Pharmacy Med Name: busPIRone HCl 30 MG Oral Tablet] 60 tablet 0     Sig: Take 1 tablet by mouth twice daily      Last office visit with prescribing clinician: 11/27/2024   Last telemedicine visit with prescribing clinician: Visit date not found   Next office visit with prescribing clinician: 6/27/2025                         Would you like a call back once the refill request has been completed: [] Yes [] No    If the office needs to give you a call back, can they leave a voicemail: [] Yes [] No    Allison Marie MA  06/16/25, 11:14 EDT

## 2025-06-17 RX ORDER — BUSPIRONE HYDROCHLORIDE 30 MG/1
30 TABLET ORAL 2 TIMES DAILY
Qty: 60 TABLET | Refills: 0 | Status: SHIPPED | OUTPATIENT
Start: 2025-06-17

## 2025-06-27 ENCOUNTER — OFFICE VISIT (OUTPATIENT)
Dept: INTERNAL MEDICINE | Facility: CLINIC | Age: 40
End: 2025-06-27
Payer: OTHER GOVERNMENT

## 2025-06-27 VITALS
DIASTOLIC BLOOD PRESSURE: 74 MMHG | SYSTOLIC BLOOD PRESSURE: 120 MMHG | RESPIRATION RATE: 14 BRPM | WEIGHT: 219.6 LBS | HEART RATE: 81 BPM | HEIGHT: 72 IN | TEMPERATURE: 98.3 F | BODY MASS INDEX: 29.74 KG/M2 | OXYGEN SATURATION: 96 %

## 2025-06-27 DIAGNOSIS — E78.2 MIXED HYPERLIPIDEMIA: ICD-10-CM

## 2025-06-27 DIAGNOSIS — F41.1 GENERALIZED ANXIETY DISORDER: Primary | ICD-10-CM

## 2025-06-27 DIAGNOSIS — I10 ESSENTIAL (PRIMARY) HYPERTENSION: ICD-10-CM

## 2025-06-27 PROCEDURE — 99214 OFFICE O/P EST MOD 30 MIN: CPT | Performed by: INTERNAL MEDICINE

## 2025-06-27 RX ORDER — DULOXETIN HYDROCHLORIDE 20 MG/1
20 CAPSULE, DELAYED RELEASE ORAL 2 TIMES DAILY
Qty: 60 CAPSULE | Refills: 2 | Status: SHIPPED | OUTPATIENT
Start: 2025-06-27

## 2025-06-30 NOTE — PROGRESS NOTES
"Chief Complaint  Hypertension, Hyperlipidemia, and Prediabetes (A1c 2/6/25 5.8)    Subjective        Kamlesh Denis presents to [facility]  Hypertension  Hyperlipidemia      Here for follow up   Taking meds as prescribed, no reported s/e.   Does feel like mood isn't well controlled, he has been on citalopram for quite some time and not noting as much improvement in symptoms currently    Objective   Vital Signs:  /74 (BP Location: Left arm, Patient Position: Sitting, Cuff Size: Adult)   Pulse 81   Temp 98.3 °F (36.8 °C) (Oral)   Resp 14   Ht 182.9 cm (72\")   Wt 99.6 kg (219 lb 9.6 oz)   SpO2 96%   BMI 29.78 kg/m²   Estimated body mass index is 29.78 kg/m² as calculated from the following:    Height as of this encounter: 182.9 cm (72\").    Weight as of this encounter: 99.6 kg (219 lb 9.6 oz).    Physical Exam  Vitals and nursing note reviewed.   Constitutional:       General: He is not in acute distress.     Appearance: Normal appearance. He is not toxic-appearing.   HENT:      Head: Normocephalic and atraumatic.      Right Ear: External ear normal.      Left Ear: External ear normal.      Nose: Nose normal.   Eyes:      General: No scleral icterus.        Right eye: No discharge.         Left eye: No discharge.      Extraocular Movements: Extraocular movements intact.      Conjunctiva/sclera: Conjunctivae normal.      Pupils: Pupils are equal, round, and reactive to light.   Cardiovascular:      Rate and Rhythm: Normal rate and regular rhythm.      Pulses: Normal pulses.      Heart sounds: Normal heart sounds. No murmur heard.  Pulmonary:      Effort: Pulmonary effort is normal.   Musculoskeletal:         General: Normal range of motion.   Skin:     General: Skin is warm.      Capillary Refill: Capillary refill takes less than 2 seconds.   Neurological:      General: No focal deficit present.      Mental Status: He is alert and oriented to person, place, " and time. Mental status is at baseline.      Cranial Nerves: No cranial nerve deficit.      Gait: Gait normal.   Psychiatric:         Mood and Affect: Mood normal.         Behavior: Behavior normal.         Thought Content: Thought content normal.         Judgment: Judgment normal.        Result Review :  The following data was reviewed by: Roscoe Roy MD on 06/27/2025:  Common labs          2/6/2025    09:02   Common Labs   Glucose 100    BUN 14    Creatinine 1.05    Sodium 139    Potassium 4.6    Chloride 102    Calcium 9.7    Albumin 4.4    Total Bilirubin 0.5    Alkaline Phosphatase 95    AST (SGOT) 41    ALT (SGPT) 57    Total Cholesterol 164    Triglycerides 196    HDL Cholesterol 47    LDL Cholesterol  84    Hemoglobin A1C 5.80      Data reviewed : prior office notes reviewed          Assessment and Plan   Diagnoses and all orders for this visit:    1. Generalized anxiety disorder (Primary)  -     DULoxetine (Cymbalta) 20 MG capsule; Take 1 capsule by mouth 2 (Two) Times a Day.  Dispense: 60 capsule; Refill: 2  Uncontrolled, stop citalopram, start cymbalta as above  Continue buspar BID    2. Mixed hyperlipidemia  Controlled, continue atorvastatin 80mg daily and zetia 10mg daily    3. Essential (primary) hypertension  Controlled continue lisinopril 20mg daily    F/u 6 months    Roscoe Roy MD  [facility]  [address]  Phone: [phone]

## 2025-07-04 DIAGNOSIS — I10 ESSENTIAL (PRIMARY) HYPERTENSION: ICD-10-CM

## 2025-07-07 RX ORDER — LISINOPRIL 20 MG/1
20 TABLET ORAL DAILY
Qty: 60 TABLET | Refills: 0 | Status: SHIPPED | OUTPATIENT
Start: 2025-07-07

## 2025-07-09 DIAGNOSIS — E78.2 MIXED HYPERLIPIDEMIA: Chronic | ICD-10-CM

## 2025-07-09 RX ORDER — ATORVASTATIN CALCIUM 80 MG/1
80 TABLET, FILM COATED ORAL DAILY
Qty: 90 TABLET | Refills: 0 | Status: SHIPPED | OUTPATIENT
Start: 2025-07-09

## 2025-07-23 DIAGNOSIS — E78.2 MIXED HYPERLIPIDEMIA: Chronic | ICD-10-CM

## 2025-07-23 RX ORDER — EZETIMIBE 10 MG/1
10 TABLET ORAL DAILY
Qty: 90 TABLET | Refills: 0 | Status: SHIPPED | OUTPATIENT
Start: 2025-07-23

## 2025-08-09 RX ORDER — BUSPIRONE HYDROCHLORIDE 30 MG/1
30 TABLET ORAL 2 TIMES DAILY
Qty: 60 TABLET | Refills: 0 | Status: SHIPPED | OUTPATIENT
Start: 2025-08-09

## 2025-08-31 DIAGNOSIS — I10 ESSENTIAL (PRIMARY) HYPERTENSION: ICD-10-CM

## 2025-08-31 RX ORDER — LISINOPRIL 20 MG/1
20 TABLET ORAL DAILY
Qty: 60 TABLET | Refills: 0 | Status: SHIPPED | OUTPATIENT
Start: 2025-08-31